# Patient Record
Sex: FEMALE | Race: BLACK OR AFRICAN AMERICAN | NOT HISPANIC OR LATINO | URBAN - METROPOLITAN AREA
[De-identification: names, ages, dates, MRNs, and addresses within clinical notes are randomized per-mention and may not be internally consistent; named-entity substitution may affect disease eponyms.]

---

## 2021-01-21 ENCOUNTER — OFFICE VISIT (OUTPATIENT)
Dept: URBAN - METROPOLITAN AREA CLINIC 19 | Facility: CLINIC | Age: 36
End: 2021-01-21
Payer: COMMERCIAL

## 2021-01-21 DIAGNOSIS — K50.111 CROHN'S DISEASE OF LARGE INTESTINE WITH RECTAL BLEEDING: ICD-10-CM

## 2021-01-21 PROCEDURE — G8427 DOCREV CUR MEDS BY ELIG CLIN: HCPCS | Performed by: INTERNAL MEDICINE

## 2021-01-21 PROCEDURE — 99215 OFFICE O/P EST HI 40 MIN: CPT | Performed by: INTERNAL MEDICINE

## 2021-01-21 PROCEDURE — 99214 OFFICE O/P EST MOD 30 MIN: CPT | Performed by: INTERNAL MEDICINE

## 2021-01-21 PROCEDURE — G8484 FLU IMMUNIZE NO ADMIN: HCPCS | Performed by: INTERNAL MEDICINE

## 2021-01-21 PROCEDURE — G9903 PT SCRN TBCO ID AS NON USER: HCPCS | Performed by: INTERNAL MEDICINE

## 2021-01-21 PROCEDURE — 1036F TOBACCO NON-USER: CPT | Performed by: INTERNAL MEDICINE

## 2021-01-21 RX ORDER — EVE PRIMROSE/LINOLEIC/G-LENIC 1000 MG
CAPSULE ORAL
Qty: 0 | Refills: 0 | Status: ACTIVE | COMMUNITY
Start: 1900-01-01

## 2021-01-21 NOTE — HPI-OTHER HISTORIES
Mrs. Vanessa is a 35 year old female who was last seen in GI clinic on 3/15/2019 for Crohn's disease.    Mrs. Vanessa reports since her last clinic visit she had another child and is currently breast feeding.   She reports having return of flare symptoms around 10/2020. She reports seeing blood in stool. She reports having associated diarrhea and abdominal pain.   She reports having 4-7 BMs/day.   Prior history is summarized below: -On 4/12/2011 she had a colonoscopy that showed "inflammation and friability of the distal sigmoid and rectum" Pathology showed "moderately active procitis" with no evidence of granulomas, viral inclusions, or dysplasia. After the procedure she was prescribed anusol and sulfasalazine. -On 10/16/2018 she had a colonoscopy that showed discontinous areas of ulcerated mucosa in the rectum, sigmoid colon, ascending colon, and cecum; the transverse colon, descending colon and terminal ileum were normal. Pathology of the cecum, ascending colon, transverse colon, sigmoid colon, and rectum showed chronic active colitis; pathology of the descending colon was unremarkable. -Labs from 10/23/2018 showed quantiferon TB was negative, vitamin D 20.1, Hep A ab total negative, Hep B s Ab negative, Hep B c Ab total negative, Hep B s AB consistent with immunity, Hep C Ab negative- she reports completing Vitamin D Rx provided -On 10/30/2018 she had a MRE that was negative other than "very large amount of stool noted throughout the colon from the cecum through the distal sigmoid colon without stricture noted." -She started cimiza on 2/13/2019 and her last dose was on 3/13/2019.

## 2021-01-27 ENCOUNTER — TELEPHONE ENCOUNTER (OUTPATIENT)
Dept: URBAN - METROPOLITAN AREA CLINIC 92 | Facility: CLINIC | Age: 36
End: 2021-01-27

## 2021-01-27 LAB
A/G RATIO: 1.6
ALBUMIN: 4.5
ALKALINE PHOSPHATASE: 75
ALT (SGPT): 13
AST (SGOT): 18
BILIRUBIN, TOTAL: 0.3
BUN/CREATININE RATIO: 10
BUN: 9
C-REACTIVE PROTEIN, QUANT: 3
CALCIUM: 9.3
CARBON DIOXIDE, TOTAL: 26
CHLORIDE: 101
CREATININE: 0.86
EGFR IF AFRICN AM: 101
EGFR IF NONAFRICN AM: 88
GASTROINTESTINAL PATHOGEN: (no result)
GLOBULIN, TOTAL: 2.9
GLUCOSE: 81
HBSAG SCREEN: NEGATIVE
HEMATOCRIT: 39.7
HEMOGLOBIN: 13
HEP B CORE AB, TOT: NEGATIVE
HEP B SURFACE AB, QUAL: REACTIVE
HEP C VIRUS AB: <0.1
MCH: 27.5
MCHC: 32.7
MCV: 84
NRBC: (no result)
PLATELETS: 382
POTASSIUM: 4.5
PROTEIN, TOTAL: 7.4
QUANTIFERON CRITERIA: (no result)
QUANTIFERON INCUBATION: (no result)
QUANTIFERON MITOGEN VALUE: >10
QUANTIFERON NIL VALUE: 0.02
QUANTIFERON TB1 AG VALUE: 0.05
QUANTIFERON TB2 AG VALUE: 0.04
QUANTIFERON-TB GOLD PLUS: NEGATIVE
RBC: 4.73
RDW: 13.1
SODIUM: 138
WBC: 8

## 2021-01-27 RX ORDER — EVE PRIMROSE/LINOLEIC/G-LENIC 1000 MG
CAPSULE ORAL
Qty: 0 | Refills: 0 | Status: ACTIVE | COMMUNITY
Start: 1900-01-01

## 2021-01-27 RX ORDER — CERTOLIZUMAB PEGOL 200 MG/ML
1 ML INJECTION, SOLUTION SUBCUTANEOUS
Qty: 2 | OUTPATIENT
Start: 2021-01-27 | End: 2021-02-26

## 2021-01-27 RX ORDER — CERTOLIZUMAB PEGOL 200 MG/ML
1 ML INJECTION, SOLUTION SUBCUTANEOUS
Qty: 6 | Refills: 3 | OUTPATIENT
Start: 2021-01-27 | End: 2022-01-22

## 2021-02-12 ENCOUNTER — TELEPHONE ENCOUNTER (OUTPATIENT)
Dept: URBAN - METROPOLITAN AREA CLINIC 92 | Facility: CLINIC | Age: 36
End: 2021-02-12

## 2021-02-12 RX ORDER — EVE PRIMROSE/LINOLEIC/G-LENIC 1000 MG
CAPSULE ORAL
Qty: 0 | Refills: 0 | Status: ACTIVE | COMMUNITY
Start: 1900-01-01

## 2021-02-12 RX ORDER — CERTOLIZUMAB PEGOL 200 MG/ML
1 ML INJECTION, SOLUTION SUBCUTANEOUS
Qty: 2 | Status: ACTIVE | COMMUNITY
Start: 2021-01-27 | End: 2021-02-26

## 2021-02-12 RX ORDER — CERTOLIZUMAB PEGOL 200 MG/ML
1 ML INJECTION, SOLUTION SUBCUTANEOUS
Qty: 2 | OUTPATIENT
Start: 2021-02-12

## 2021-02-12 RX ORDER — CERTOLIZUMAB PEGOL 200 MG/ML
1 ML INJECTION, SOLUTION SUBCUTANEOUS
Qty: 6 | Refills: 3 | Status: ACTIVE | COMMUNITY
Start: 2021-01-27 | End: 2022-01-22

## 2021-02-12 RX ORDER — CERTOLIZUMAB PEGOL 200 MG/ML
1 ML INJECTION, SOLUTION SUBCUTANEOUS
Qty: 6 | Refills: 3 | OUTPATIENT
Start: 2021-02-12 | End: 2022-02-07

## 2021-03-18 ENCOUNTER — OFFICE VISIT (OUTPATIENT)
Dept: URBAN - METROPOLITAN AREA CLINIC 19 | Facility: CLINIC | Age: 36
End: 2021-03-18

## 2021-04-19 ENCOUNTER — TELEPHONE ENCOUNTER (OUTPATIENT)
Dept: URBAN - METROPOLITAN AREA CLINIC 92 | Facility: CLINIC | Age: 36
End: 2021-04-19

## 2021-04-23 ENCOUNTER — TELEPHONE ENCOUNTER (OUTPATIENT)
Dept: URBAN - METROPOLITAN AREA CLINIC 96 | Facility: CLINIC | Age: 36
End: 2021-04-23

## 2021-04-23 ENCOUNTER — TELEPHONE ENCOUNTER (OUTPATIENT)
Dept: URBAN - METROPOLITAN AREA CLINIC 19 | Facility: CLINIC | Age: 36
End: 2021-04-23

## 2021-05-13 ENCOUNTER — TELEPHONE ENCOUNTER (OUTPATIENT)
Dept: URBAN - METROPOLITAN AREA CLINIC 19 | Facility: CLINIC | Age: 36
End: 2021-05-13

## 2021-05-20 ENCOUNTER — OFFICE VISIT (OUTPATIENT)
Dept: URBAN - METROPOLITAN AREA CLINIC 79 | Facility: CLINIC | Age: 36
End: 2021-05-20
Payer: COMMERCIAL

## 2021-05-20 VITALS
TEMPERATURE: 97.5 F | HEART RATE: 102 BPM | RESPIRATION RATE: 20 BRPM | HEIGHT: 62 IN | SYSTOLIC BLOOD PRESSURE: 117 MMHG | DIASTOLIC BLOOD PRESSURE: 77 MMHG | BODY MASS INDEX: 33.86 KG/M2 | WEIGHT: 184 LBS

## 2021-05-20 DIAGNOSIS — K50.80 CROHN'S COLITIS: ICD-10-CM

## 2021-05-20 PROCEDURE — 96372 THER/PROPH/DIAG INJ SC/IM: CPT | Performed by: INTERNAL MEDICINE

## 2021-05-20 RX ORDER — CERTOLIZUMAB PEGOL 200 MG/ML
1 ML INJECTION, SOLUTION SUBCUTANEOUS
Qty: 2 | Status: ACTIVE | COMMUNITY
Start: 2021-02-12

## 2021-05-20 RX ORDER — CERTOLIZUMAB PEGOL 200 MG/ML
1 ML INJECTION, SOLUTION SUBCUTANEOUS
Qty: 6 | Refills: 3 | Status: ACTIVE | COMMUNITY
Start: 2021-02-12 | End: 2022-02-07

## 2021-05-20 RX ORDER — CERTOLIZUMAB PEGOL 200 MG/ML
1 ML INJECTION, SOLUTION SUBCUTANEOUS
Qty: 6 | Refills: 3 | Status: ACTIVE | COMMUNITY
Start: 2021-01-27 | End: 2022-01-22

## 2021-05-20 RX ORDER — EVE PRIMROSE/LINOLEIC/G-LENIC 1000 MG
CAPSULE ORAL
Qty: 0 | Refills: 0 | Status: ACTIVE | COMMUNITY
Start: 1900-01-01

## 2021-06-03 ENCOUNTER — OFFICE VISIT (OUTPATIENT)
Dept: URBAN - METROPOLITAN AREA CLINIC 79 | Facility: CLINIC | Age: 36
End: 2021-06-03
Payer: COMMERCIAL

## 2021-06-03 VITALS
BODY MASS INDEX: 33.86 KG/M2 | SYSTOLIC BLOOD PRESSURE: 121 MMHG | TEMPERATURE: 97.5 F | RESPIRATION RATE: 20 BRPM | HEIGHT: 62 IN | DIASTOLIC BLOOD PRESSURE: 84 MMHG | HEART RATE: 95 BPM | WEIGHT: 184 LBS

## 2021-06-03 DIAGNOSIS — K50.80 CROHN'S COLITIS: ICD-10-CM

## 2021-06-03 PROCEDURE — 96372 THER/PROPH/DIAG INJ SC/IM: CPT | Performed by: INTERNAL MEDICINE

## 2021-06-03 RX ORDER — CERTOLIZUMAB PEGOL 200 MG/ML
1 ML INJECTION, SOLUTION SUBCUTANEOUS
Qty: 6 | Refills: 3 | Status: ACTIVE | COMMUNITY
Start: 2021-01-27 | End: 2022-01-22

## 2021-06-03 RX ORDER — CERTOLIZUMAB PEGOL 200 MG/ML
1 ML INJECTION, SOLUTION SUBCUTANEOUS
Qty: 6 | Refills: 3 | Status: ACTIVE | COMMUNITY
Start: 2021-02-12 | End: 2022-02-07

## 2021-06-03 RX ORDER — CERTOLIZUMAB PEGOL 200 MG/ML
1 ML INJECTION, SOLUTION SUBCUTANEOUS
Qty: 2 | Status: ACTIVE | COMMUNITY
Start: 2021-02-12

## 2021-06-03 RX ORDER — EVE PRIMROSE/LINOLEIC/G-LENIC 1000 MG
CAPSULE ORAL
Qty: 0 | Refills: 0 | Status: ACTIVE | COMMUNITY
Start: 1900-01-01

## 2021-06-15 ENCOUNTER — OFFICE VISIT (OUTPATIENT)
Dept: URBAN - METROPOLITAN AREA CLINIC 79 | Facility: CLINIC | Age: 36
End: 2021-06-15
Payer: COMMERCIAL

## 2021-06-15 VITALS
HEART RATE: 99 BPM | RESPIRATION RATE: 20 BRPM | HEIGHT: 62 IN | WEIGHT: 186 LBS | TEMPERATURE: 97.9 F | BODY MASS INDEX: 34.23 KG/M2 | DIASTOLIC BLOOD PRESSURE: 79 MMHG | SYSTOLIC BLOOD PRESSURE: 109 MMHG

## 2021-06-15 DIAGNOSIS — K50.80 CROHN'S COLITIS: ICD-10-CM

## 2021-06-15 PROCEDURE — 96372 THER/PROPH/DIAG INJ SC/IM: CPT | Performed by: INTERNAL MEDICINE

## 2021-06-15 RX ORDER — EVE PRIMROSE/LINOLEIC/G-LENIC 1000 MG
CAPSULE ORAL
Qty: 0 | Refills: 0 | Status: ACTIVE | COMMUNITY
Start: 1900-01-01

## 2021-06-15 RX ORDER — CERTOLIZUMAB PEGOL 200 MG/ML
1 ML INJECTION, SOLUTION SUBCUTANEOUS
Qty: 2 | Status: ACTIVE | COMMUNITY
Start: 2021-02-12

## 2021-06-15 RX ORDER — CERTOLIZUMAB PEGOL 200 MG/ML
1 ML INJECTION, SOLUTION SUBCUTANEOUS
Qty: 6 | Refills: 3 | Status: ACTIVE | COMMUNITY
Start: 2021-01-27 | End: 2022-01-22

## 2021-06-15 RX ORDER — CERTOLIZUMAB PEGOL 200 MG/ML
1 ML INJECTION, SOLUTION SUBCUTANEOUS
Qty: 6 | Refills: 3 | Status: ACTIVE | COMMUNITY
Start: 2021-02-12 | End: 2022-02-07

## 2021-06-17 ENCOUNTER — OFFICE VISIT (OUTPATIENT)
Dept: URBAN - METROPOLITAN AREA CLINIC 79 | Facility: CLINIC | Age: 36
End: 2021-06-17

## 2021-06-22 ENCOUNTER — TELEPHONE ENCOUNTER (OUTPATIENT)
Dept: URBAN - METROPOLITAN AREA CLINIC 92 | Facility: CLINIC | Age: 36
End: 2021-06-22

## 2021-06-22 RX ORDER — CERTOLIZUMAB PEGOL 200 MG/ML
1 ML INJECTION, SOLUTION SUBCUTANEOUS
Qty: 6 | Refills: 3
Start: 2021-01-27 | End: 2022-06-17

## 2021-06-28 ENCOUNTER — WEB ENCOUNTER (OUTPATIENT)
Dept: URBAN - METROPOLITAN AREA CLINIC 19 | Facility: CLINIC | Age: 36
End: 2021-06-28

## 2021-06-28 ENCOUNTER — OFFICE VISIT (OUTPATIENT)
Dept: URBAN - METROPOLITAN AREA CLINIC 19 | Facility: CLINIC | Age: 36
End: 2021-06-28
Payer: COMMERCIAL

## 2021-06-28 DIAGNOSIS — E55.9 VITAMIN D DEFICIENCY: ICD-10-CM

## 2021-06-28 DIAGNOSIS — K50.10 CROHN'S DISEASE OF LARGE INTESTINE WITHOUT COMPLICATION: ICD-10-CM

## 2021-06-28 PROCEDURE — 99214 OFFICE O/P EST MOD 30 MIN: CPT | Performed by: INTERNAL MEDICINE

## 2021-06-28 RX ORDER — CERTOLIZUMAB PEGOL 200 MG/ML
1 ML INJECTION, SOLUTION SUBCUTANEOUS
Qty: 6 | Refills: 3 | Status: ACTIVE | COMMUNITY
Start: 2021-01-27 | End: 2022-06-17

## 2021-06-28 RX ORDER — EVE PRIMROSE/LINOLEIC/G-LENIC 1000 MG
CAPSULE ORAL
Qty: 0 | Refills: 0 | Status: ACTIVE | COMMUNITY
Start: 1900-01-01

## 2021-06-28 RX ORDER — CERTOLIZUMAB PEGOL 200 MG/ML
1 ML INJECTION, SOLUTION SUBCUTANEOUS
Qty: 6 | Refills: 3 | Status: ACTIVE | COMMUNITY
Start: 2021-02-12 | End: 2022-02-07

## 2021-06-28 RX ORDER — CERTOLIZUMAB PEGOL 200 MG/ML
1 ML INJECTION, SOLUTION SUBCUTANEOUS
Qty: 2 | Status: ACTIVE | COMMUNITY
Start: 2021-02-12

## 2021-06-28 NOTE — HPI-OTHER HISTORIES
Mrs. Vanessa is a 35 year old female who was last seen in GI clinic on 1/21/2021 for Crohn's disease.        On 1/21/2021 Hgb 13.0, Cr 0.86, Alb 4.5, AST 18, ALT 13, Alk phos 75, T bili 0.3, CRP 3 (0-10), Quantiferon-TB negative, Hepatitis B s Ab positive, Hep B s Ag negative, Hep B c Ab total negative, Hep C Ab negative, GPP stool test was negative.   Mrs. Vanessa reports her Crohn's flare symptoms have resolved. She reports having 1-2 BMs/day which is an improvement from 4-7 BMs/day; she reports no blood in stool.   She reports finishing her prednisone taper 1 week ago. She reports finishing her cimzia loading doses and reports she will be starting maintance dosing 7/13/2021.   Prior history is summarized below: -On 4/12/2011 she had a colonoscopy that showed "inflammation and friability of the distal sigmoid and rectum" Pathology showed "moderately active procitis" with no evidence of granulomas, viral inclusions, or dysplasia. After the procedure she was prescribed anusol and sulfasalazine. -On 10/16/2018 she had a colonoscopy that showed discontinous areas of ulcerated mucosa in the rectum, sigmoid colon, ascending colon, and cecum; the transverse colon, descending colon and terminal ileum were normal. Pathology of the cecum, ascending colon, transverse colon, sigmoid colon, and rectum showed chronic active colitis; pathology of the descending colon was unremarkable. -Labs from 10/23/2018 showed quantiferon TB was negative, vitamin D 20.1, Hep A ab total negative, Hep B s Ab negative, Hep B c Ab total negative, Hep B s AB consistent with immunity, Hep C Ab negative- she reports completing Vitamin D Rx provided -On 10/30/2018 she had a MRE that was negative other than "very large amount of stool noted throughout the colon from the cecum through the distal sigmoid colon without stricture noted." -She started cimiza on 2/13/2019 and her last dose was on 3/13/2019. -She reports having return of flare symptoms around 10/2020. At last clinic visit we prescribed prednisone taper and we reports she wanted she wanted to try cimzia again.

## 2021-06-29 ENCOUNTER — TELEPHONE ENCOUNTER (OUTPATIENT)
Dept: URBAN - METROPOLITAN AREA CLINIC 92 | Facility: CLINIC | Age: 36
End: 2021-06-29

## 2021-06-29 LAB — VITAMIN D, 25-HYDROXY: 27.4

## 2021-07-13 ENCOUNTER — OFFICE VISIT (OUTPATIENT)
Dept: URBAN - METROPOLITAN AREA CLINIC 79 | Facility: CLINIC | Age: 36
End: 2021-07-13

## 2021-07-26 ENCOUNTER — WEB ENCOUNTER (OUTPATIENT)
Dept: URBAN - METROPOLITAN AREA CLINIC 20 | Facility: CLINIC | Age: 36
End: 2021-07-26

## 2021-08-21 ENCOUNTER — WEB ENCOUNTER (OUTPATIENT)
Dept: URBAN - METROPOLITAN AREA CLINIC 20 | Facility: CLINIC | Age: 36
End: 2021-08-21

## 2021-09-18 LAB
ANTI-CERTOLIZUMAB AB LEVEL: (no result)
CERTOLIZUMAB DRUG LEVEL: <1

## 2021-09-20 ENCOUNTER — TELEPHONE ENCOUNTER (OUTPATIENT)
Dept: URBAN - METROPOLITAN AREA CLINIC 92 | Facility: CLINIC | Age: 36
End: 2021-09-20

## 2021-09-20 ENCOUNTER — WEB ENCOUNTER (OUTPATIENT)
Dept: URBAN - METROPOLITAN AREA CLINIC 20 | Facility: CLINIC | Age: 36
End: 2021-09-20

## 2021-09-20 RX ORDER — EVE PRIMROSE/LINOLEIC/G-LENIC 1000 MG
CAPSULE ORAL
Qty: 0 | Refills: 0 | Status: ACTIVE | COMMUNITY
Start: 1900-01-01

## 2021-09-20 RX ORDER — CERTOLIZUMAB PEGOL 200 MG/ML
1 ML INJECTION, SOLUTION SUBCUTANEOUS
Qty: 2 | Status: ACTIVE | COMMUNITY
Start: 2021-02-12

## 2021-09-20 RX ORDER — VEDOLIZUMAB 300 MG/5ML
AS DIRECTED INJECTION, POWDER, LYOPHILIZED, FOR SOLUTION INTRAVENOUS
Qty: 300 MILLIGRAMS | Refills: 0 | OUTPATIENT
Start: 2021-09-20 | End: 2021-12-18

## 2021-09-20 RX ORDER — CERTOLIZUMAB PEGOL 200 MG/ML
1 ML INJECTION, SOLUTION SUBCUTANEOUS
Qty: 6 | Refills: 3 | Status: ACTIVE | COMMUNITY
Start: 2021-01-27 | End: 2022-06-17

## 2021-09-20 RX ORDER — CERTOLIZUMAB PEGOL 200 MG/ML
1 ML INJECTION, SOLUTION SUBCUTANEOUS
Qty: 6 | Refills: 3 | Status: ACTIVE | COMMUNITY
Start: 2021-02-12 | End: 2022-02-07

## 2021-10-02 ENCOUNTER — WEB ENCOUNTER (OUTPATIENT)
Dept: URBAN - METROPOLITAN AREA CLINIC 19 | Facility: CLINIC | Age: 36
End: 2021-10-02

## 2021-10-11 ENCOUNTER — WEB ENCOUNTER (OUTPATIENT)
Dept: URBAN - METROPOLITAN AREA CLINIC 20 | Facility: CLINIC | Age: 36
End: 2021-10-11

## 2021-10-21 ENCOUNTER — OFFICE VISIT (OUTPATIENT)
Dept: URBAN - METROPOLITAN AREA CLINIC 79 | Facility: CLINIC | Age: 36
End: 2021-10-21
Payer: COMMERCIAL

## 2021-10-21 VITALS
DIASTOLIC BLOOD PRESSURE: 75 MMHG | TEMPERATURE: 97.5 F | WEIGHT: 188 LBS | HEIGHT: 62 IN | RESPIRATION RATE: 18 BRPM | HEART RATE: 93 BPM | BODY MASS INDEX: 34.6 KG/M2 | SYSTOLIC BLOOD PRESSURE: 105 MMHG

## 2021-10-21 DIAGNOSIS — K50.80 CROHN'S COLITIS: ICD-10-CM

## 2021-10-21 PROCEDURE — 96413 CHEMO IV INFUSION 1 HR: CPT | Performed by: INTERNAL MEDICINE

## 2021-10-21 RX ORDER — CERTOLIZUMAB PEGOL 200 MG/ML
1 ML INJECTION, SOLUTION SUBCUTANEOUS
Qty: 2 | Status: ACTIVE | COMMUNITY
Start: 2021-02-12

## 2021-10-21 RX ORDER — VEDOLIZUMAB 300 MG/5ML
AS DIRECTED INJECTION, POWDER, LYOPHILIZED, FOR SOLUTION INTRAVENOUS
Qty: 300 MILLIGRAMS | Refills: 0 | Status: ACTIVE | COMMUNITY
Start: 2021-09-20 | End: 2021-12-18

## 2021-10-21 RX ORDER — CERTOLIZUMAB PEGOL 200 MG/ML
1 ML INJECTION, SOLUTION SUBCUTANEOUS
Qty: 6 | Refills: 3 | Status: ACTIVE | COMMUNITY
Start: 2021-02-12 | End: 2022-02-07

## 2021-10-21 RX ORDER — EVE PRIMROSE/LINOLEIC/G-LENIC 1000 MG
CAPSULE ORAL
Qty: 0 | Refills: 0 | Status: ACTIVE | COMMUNITY
Start: 1900-01-01

## 2021-10-21 RX ORDER — CERTOLIZUMAB PEGOL 200 MG/ML
1 ML INJECTION, SOLUTION SUBCUTANEOUS
Qty: 6 | Refills: 3 | Status: ACTIVE | COMMUNITY
Start: 2021-01-27 | End: 2022-06-17

## 2021-11-03 ENCOUNTER — TELEPHONE ENCOUNTER (OUTPATIENT)
Dept: URBAN - METROPOLITAN AREA CLINIC 80 | Facility: CLINIC | Age: 36
End: 2021-11-03

## 2021-11-09 ENCOUNTER — OFFICE VISIT (OUTPATIENT)
Dept: URBAN - METROPOLITAN AREA CLINIC 79 | Facility: CLINIC | Age: 36
End: 2021-11-09
Payer: COMMERCIAL

## 2021-11-09 VITALS
DIASTOLIC BLOOD PRESSURE: 81 MMHG | BODY MASS INDEX: 34.23 KG/M2 | HEART RATE: 93 BPM | TEMPERATURE: 97.7 F | RESPIRATION RATE: 18 BRPM | HEIGHT: 62 IN | WEIGHT: 186 LBS | SYSTOLIC BLOOD PRESSURE: 111 MMHG

## 2021-11-09 DIAGNOSIS — K50.80 CROHN'S COLITIS: ICD-10-CM

## 2021-11-09 PROCEDURE — 96413 CHEMO IV INFUSION 1 HR: CPT | Performed by: INTERNAL MEDICINE

## 2021-11-09 RX ORDER — CERTOLIZUMAB PEGOL 200 MG/ML
1 ML INJECTION, SOLUTION SUBCUTANEOUS
Qty: 6 | Refills: 3 | Status: ACTIVE | COMMUNITY
Start: 2021-01-27 | End: 2022-06-17

## 2021-11-09 RX ORDER — VEDOLIZUMAB 300 MG/5ML
AS DIRECTED INJECTION, POWDER, LYOPHILIZED, FOR SOLUTION INTRAVENOUS
Qty: 300 MILLIGRAMS | Refills: 0 | Status: ACTIVE | COMMUNITY
Start: 2021-09-20 | End: 2021-12-18

## 2021-11-09 RX ORDER — CERTOLIZUMAB PEGOL 200 MG/ML
1 ML INJECTION, SOLUTION SUBCUTANEOUS
Qty: 6 | Refills: 3 | Status: ACTIVE | COMMUNITY
Start: 2021-02-12 | End: 2022-02-07

## 2021-11-09 RX ORDER — EVE PRIMROSE/LINOLEIC/G-LENIC 1000 MG
CAPSULE ORAL
Qty: 0 | Refills: 0 | Status: ACTIVE | COMMUNITY
Start: 1900-01-01

## 2021-11-09 RX ORDER — CERTOLIZUMAB PEGOL 200 MG/ML
1 ML INJECTION, SOLUTION SUBCUTANEOUS
Qty: 2 | Status: ACTIVE | COMMUNITY
Start: 2021-02-12

## 2021-11-12 ENCOUNTER — OFFICE VISIT (OUTPATIENT)
Dept: URBAN - METROPOLITAN AREA SURGERY CENTER 31 | Facility: SURGERY CENTER | Age: 36
End: 2021-11-12

## 2021-11-19 ENCOUNTER — OFFICE VISIT (OUTPATIENT)
Dept: URBAN - METROPOLITAN AREA SURGERY CENTER 31 | Facility: SURGERY CENTER | Age: 36
End: 2021-11-19

## 2021-12-02 ENCOUNTER — OFFICE VISIT (OUTPATIENT)
Dept: URBAN - METROPOLITAN AREA CLINIC 79 | Facility: CLINIC | Age: 36
End: 2021-12-02
Payer: COMMERCIAL

## 2021-12-02 ENCOUNTER — OFFICE VISIT (OUTPATIENT)
Dept: URBAN - METROPOLITAN AREA CLINIC 19 | Facility: CLINIC | Age: 36
End: 2021-12-02

## 2021-12-02 VITALS
SYSTOLIC BLOOD PRESSURE: 108 MMHG | BODY MASS INDEX: 34.6 KG/M2 | HEART RATE: 78 BPM | WEIGHT: 188 LBS | TEMPERATURE: 97.5 F | HEIGHT: 62 IN | RESPIRATION RATE: 16 BRPM | DIASTOLIC BLOOD PRESSURE: 77 MMHG

## 2021-12-02 DIAGNOSIS — K50.80 CROHN'S COLITIS: ICD-10-CM

## 2021-12-02 PROCEDURE — 96413 CHEMO IV INFUSION 1 HR: CPT | Performed by: INTERNAL MEDICINE

## 2021-12-02 RX ORDER — EVE PRIMROSE/LINOLEIC/G-LENIC 1000 MG
CAPSULE ORAL
Qty: 0 | Refills: 0 | Status: ACTIVE | COMMUNITY
Start: 1900-01-01

## 2021-12-02 RX ORDER — VEDOLIZUMAB 300 MG/5ML
AS DIRECTED INJECTION, POWDER, LYOPHILIZED, FOR SOLUTION INTRAVENOUS
Qty: 300 MILLIGRAMS | Refills: 0 | Status: ACTIVE | COMMUNITY
Start: 2021-09-20 | End: 2021-12-18

## 2021-12-02 RX ORDER — CERTOLIZUMAB PEGOL 200 MG/ML
1 ML INJECTION, SOLUTION SUBCUTANEOUS
Qty: 6 | Refills: 3 | Status: ACTIVE | COMMUNITY
Start: 2021-02-12 | End: 2022-02-07

## 2021-12-02 RX ORDER — CERTOLIZUMAB PEGOL 200 MG/ML
1 ML INJECTION, SOLUTION SUBCUTANEOUS
Qty: 2 | Status: ACTIVE | COMMUNITY
Start: 2021-02-12

## 2021-12-02 RX ORDER — CERTOLIZUMAB PEGOL 200 MG/ML
1 ML INJECTION, SOLUTION SUBCUTANEOUS
Qty: 6 | Refills: 3 | Status: ACTIVE | COMMUNITY
Start: 2021-01-27 | End: 2022-06-17

## 2021-12-06 PROBLEM — 71833008 CROHN'S DISEASE OF SMALL AND LARGE INTESTINES: Status: ACTIVE | Noted: 2021-12-06

## 2021-12-20 ENCOUNTER — TELEPHONE ENCOUNTER (OUTPATIENT)
Dept: URBAN - METROPOLITAN AREA CLINIC 19 | Facility: CLINIC | Age: 36
End: 2021-12-20

## 2021-12-20 RX ORDER — CERTOLIZUMAB PEGOL 200 MG/ML
1 ML INJECTION, SOLUTION SUBCUTANEOUS
Qty: 6 | Refills: 3 | Status: DISCONTINUED | COMMUNITY
Start: 2021-01-27 | End: 2022-06-17

## 2021-12-20 RX ORDER — VEDOLIZUMAB 300 MG/5ML
AS DIRECTED INJECTION, POWDER, LYOPHILIZED, FOR SOLUTION INTRAVENOUS
Qty: 300 MILLIGRAMS | Refills: 0 | OUTPATIENT
Start: 2021-12-20 | End: 2022-03-20

## 2021-12-20 RX ORDER — CERTOLIZUMAB PEGOL 200 MG/ML
1 ML INJECTION, SOLUTION SUBCUTANEOUS
Qty: 6 | Refills: 3 | Status: DISCONTINUED | COMMUNITY
Start: 2021-02-12 | End: 2022-02-07

## 2021-12-20 RX ORDER — CERTOLIZUMAB PEGOL 200 MG/ML
1 ML INJECTION, SOLUTION SUBCUTANEOUS
Qty: 2 | Status: ON HOLD | COMMUNITY
Start: 2021-02-12

## 2021-12-20 RX ORDER — VEDOLIZUMAB 300 MG/5ML
AS DIRECTED INJECTION, POWDER, LYOPHILIZED, FOR SOLUTION INTRAVENOUS
Status: ACTIVE | COMMUNITY

## 2021-12-20 RX ORDER — VEDOLIZUMAB 300 MG/5ML
AS DIRECTED INJECTION, POWDER, LYOPHILIZED, FOR SOLUTION INTRAVENOUS
Qty: 300 MILLIGRAMS | Refills: 0 | Status: ON HOLD | COMMUNITY
Start: 2021-09-20 | End: 2021-12-18

## 2021-12-20 RX ORDER — CERTOLIZUMAB PEGOL 200 MG/ML
1 ML INJECTION, SOLUTION SUBCUTANEOUS
Qty: 2 | Status: DISCONTINUED | COMMUNITY
Start: 2021-02-12

## 2021-12-20 RX ORDER — CERTOLIZUMAB PEGOL 200 MG/ML
1 ML INJECTION, SOLUTION SUBCUTANEOUS
Qty: 6 | Refills: 3 | Status: ON HOLD | COMMUNITY
Start: 2021-02-12 | End: 2022-02-07

## 2021-12-20 RX ORDER — EVE PRIMROSE/LINOLEIC/G-LENIC 1000 MG
CAPSULE ORAL
Qty: 0 | Refills: 0 | Status: ACTIVE | COMMUNITY
Start: 1900-01-01

## 2021-12-20 RX ORDER — CERTOLIZUMAB PEGOL 200 MG/ML
1 ML INJECTION, SOLUTION SUBCUTANEOUS
Qty: 6 | Refills: 3 | Status: ON HOLD | COMMUNITY
Start: 2021-01-27 | End: 2022-06-17

## 2022-01-25 ENCOUNTER — OFFICE VISIT (OUTPATIENT)
Dept: URBAN - METROPOLITAN AREA CLINIC 79 | Facility: CLINIC | Age: 37
End: 2022-01-25
Payer: COMMERCIAL

## 2022-01-25 VITALS
BODY MASS INDEX: 34.23 KG/M2 | HEART RATE: 81 BPM | SYSTOLIC BLOOD PRESSURE: 103 MMHG | HEIGHT: 62 IN | RESPIRATION RATE: 18 BRPM | TEMPERATURE: 98.1 F | DIASTOLIC BLOOD PRESSURE: 77 MMHG | WEIGHT: 186 LBS

## 2022-01-25 DIAGNOSIS — K50.80 CROHN'S COLITIS: ICD-10-CM

## 2022-01-25 PROCEDURE — 96413 CHEMO IV INFUSION 1 HR: CPT | Performed by: INTERNAL MEDICINE

## 2022-01-25 RX ORDER — CERTOLIZUMAB PEGOL 200 MG/ML
1 ML INJECTION, SOLUTION SUBCUTANEOUS
Qty: 6 | Refills: 3 | Status: ON HOLD | COMMUNITY
Start: 2021-02-12 | End: 2022-02-07

## 2022-01-25 RX ORDER — CERTOLIZUMAB PEGOL 200 MG/ML
1 ML INJECTION, SOLUTION SUBCUTANEOUS
Qty: 6 | Refills: 3 | Status: ON HOLD | COMMUNITY
Start: 2021-01-27 | End: 2022-06-17

## 2022-01-25 RX ORDER — CERTOLIZUMAB PEGOL 200 MG/ML
1 ML INJECTION, SOLUTION SUBCUTANEOUS
Qty: 2 | Status: ON HOLD | COMMUNITY
Start: 2021-02-12

## 2022-01-25 RX ORDER — VEDOLIZUMAB 300 MG/5ML
AS DIRECTED INJECTION, POWDER, LYOPHILIZED, FOR SOLUTION INTRAVENOUS
Status: ACTIVE | COMMUNITY

## 2022-01-25 RX ORDER — VEDOLIZUMAB 300 MG/5ML
AS DIRECTED INJECTION, POWDER, LYOPHILIZED, FOR SOLUTION INTRAVENOUS
Qty: 300 MILLIGRAMS | Refills: 0 | Status: ACTIVE | COMMUNITY
Start: 2021-12-20 | End: 2022-03-20

## 2022-01-25 RX ORDER — EVE PRIMROSE/LINOLEIC/G-LENIC 1000 MG
CAPSULE ORAL
Qty: 0 | Refills: 0 | Status: ACTIVE | COMMUNITY
Start: 1900-01-01

## 2022-03-14 ENCOUNTER — WEB ENCOUNTER (OUTPATIENT)
Dept: URBAN - METROPOLITAN AREA CLINIC 19 | Facility: CLINIC | Age: 37
End: 2022-03-14

## 2022-03-22 ENCOUNTER — OFFICE VISIT (OUTPATIENT)
Dept: URBAN - METROPOLITAN AREA CLINIC 79 | Facility: CLINIC | Age: 37
End: 2022-03-22
Payer: COMMERCIAL

## 2022-03-22 VITALS
HEART RATE: 102 BPM | WEIGHT: 186 LBS | TEMPERATURE: 97.7 F | BODY MASS INDEX: 34.23 KG/M2 | SYSTOLIC BLOOD PRESSURE: 103 MMHG | HEIGHT: 62 IN | RESPIRATION RATE: 18 BRPM | DIASTOLIC BLOOD PRESSURE: 77 MMHG

## 2022-03-22 DIAGNOSIS — K50.80 CROHN'S COLITIS: ICD-10-CM

## 2022-03-22 PROCEDURE — 96413 CHEMO IV INFUSION 1 HR: CPT | Performed by: INTERNAL MEDICINE

## 2022-03-22 RX ORDER — EVE PRIMROSE/LINOLEIC/G-LENIC 1000 MG
CAPSULE ORAL
Qty: 0 | Refills: 0 | Status: ACTIVE | COMMUNITY
Start: 1900-01-01

## 2022-03-22 RX ORDER — CERTOLIZUMAB PEGOL 200 MG/ML
1 ML INJECTION, SOLUTION SUBCUTANEOUS
Qty: 6 | Refills: 3 | Status: ON HOLD | COMMUNITY
Start: 2021-01-27 | End: 2022-06-17

## 2022-03-22 RX ORDER — CERTOLIZUMAB PEGOL 200 MG/ML
1 ML INJECTION, SOLUTION SUBCUTANEOUS
Qty: 2 | Status: ON HOLD | COMMUNITY
Start: 2021-02-12

## 2022-03-22 RX ORDER — VEDOLIZUMAB 300 MG/5ML
AS DIRECTED INJECTION, POWDER, LYOPHILIZED, FOR SOLUTION INTRAVENOUS
Status: ACTIVE | COMMUNITY

## 2022-03-31 ENCOUNTER — OFFICE VISIT (OUTPATIENT)
Dept: URBAN - METROPOLITAN AREA CLINIC 19 | Facility: CLINIC | Age: 37
End: 2022-03-31
Payer: COMMERCIAL

## 2022-03-31 ENCOUNTER — LAB OUTSIDE AN ENCOUNTER (OUTPATIENT)
Dept: URBAN - METROPOLITAN AREA CLINIC 19 | Facility: CLINIC | Age: 37
End: 2022-03-31

## 2022-03-31 DIAGNOSIS — K50.10 CROHN'S DISEASE OF LARGE INTESTINE WITHOUT COMPLICATION: ICD-10-CM

## 2022-03-31 PROCEDURE — 99213 OFFICE O/P EST LOW 20 MIN: CPT | Performed by: NURSE PRACTITIONER

## 2022-03-31 RX ORDER — EVE PRIMROSE/LINOLEIC/G-LENIC 1000 MG
CAPSULE ORAL
Qty: 0 | Refills: 0 | Status: ON HOLD | COMMUNITY
Start: 1900-01-01

## 2022-03-31 RX ORDER — CERTOLIZUMAB PEGOL 200 MG/ML
1 ML INJECTION, SOLUTION SUBCUTANEOUS
Qty: 6 | Refills: 3 | Status: ON HOLD | COMMUNITY
Start: 2021-01-27 | End: 2022-06-17

## 2022-03-31 RX ORDER — CERTOLIZUMAB PEGOL 200 MG/ML
1 ML INJECTION, SOLUTION SUBCUTANEOUS
Qty: 2 | Status: ON HOLD | COMMUNITY
Start: 2021-02-12

## 2022-03-31 RX ORDER — VEDOLIZUMAB 300 MG/5ML
AS DIRECTED INJECTION, POWDER, LYOPHILIZED, FOR SOLUTION INTRAVENOUS
Status: ACTIVE | COMMUNITY

## 2022-03-31 NOTE — HPI-TODAY'S VISIT:
Ms. Vanessa is a 36-year-old female who was last seen in GI clinic by Dr. Min on 6/28/2021 for Crohn's disease.  Patient was started on Entyvio 11/2021.  Today, she reports she had left-sided abdominal pain and increase in number of bowel movements a couple of weeks ago.  She had her infusion about a week later.  She reports after receiving her infusion she felt better.  She reports her symptoms started on about week 6 after receiving her last infusion.  Patient had labs with PCP 3/4/2022.  She had a normal CBC and CMP.  She also had vitamin D checked which was 24.  She reports she was not taking it regularly at that time.  She reports she is now taking it daily and her PCP plans recheck in June.  Patient reports she is having formed bowel movements twice a day.  No blood in stool or abdominal pain.  Overall, feeling well today. She denies cardiac/kidney/lung disease, diabetes, blood thinners, and home O2.   Prior history is summarized below: -On 4/12/2011 she had a colonoscopy that showed "inflammation and friability of the distal sigmoid and rectum" Pathology showed "moderately active procitis" with no evidence of granulomas, viral inclusions, or dysplasia. After the procedure she was prescribed anusol and sulfasalazine. -On 10/16/2018 she had a colonoscopy that showed discontinous areas of ulcerated mucosa in the rectum, sigmoid colon, ascending colon, and cecum; the transverse colon, descending colon and terminal ileum were normal. Pathology of the cecum, ascending colon, transverse colon, sigmoid colon, and rectum showed chronic active colitis; pathology of the descending colon was unremarkable. -Labs from 10/23/2018 showed quantiferon TB was negative, vitamin D 20.1, Hep A ab total negative, Hep B s Ab negative, Hep B c Ab total negative, Hep B s AB consistent with immunity, Hep C Ab negative- she reports completing Vitamin D Rx provided -On 10/30/2018 she had a MRE that was negative other than "very large amount of stool noted throughout the colon from the cecum through the distal sigmoid colon without stricture noted." -She started cimiza on 2/13/2019 and her last dose was on 3/13/2019. -She reports having return of flare symptoms around 10/2020. At last clinic visit we prescribed prednisone taper and we reports she wanted she wanted to try cimzia again. -On 1/21/2021 Hgb 13.0, Cr 0.86, Alb 4.5, AST 18, ALT 13, Alk phos 75, T bili 0.3, CRP 3 (0-10), Quantiferon-TB negative, Hepatitis B s Ab positive, Hep B s Ag negative, Hep B c Ab total negative, Hep C Ab negative, GPP stool test was negative.

## 2022-04-05 ENCOUNTER — WEB ENCOUNTER (OUTPATIENT)
Dept: URBAN - METROPOLITAN AREA CLINIC 19 | Facility: CLINIC | Age: 37
End: 2022-04-05

## 2022-04-05 LAB
C-REACTIVE PROTEIN, QUANT: 4
HBSAG SCREEN: NEGATIVE
QUANTIFERON CRITERIA: (no result)
QUANTIFERON INCUBATION: (no result)
QUANTIFERON MITOGEN VALUE: >10
QUANTIFERON NIL VALUE: 0.1
QUANTIFERON TB1 AG VALUE: 0.1
QUANTIFERON TB2 AG VALUE: 0.14
QUANTIFERON-TB GOLD PLUS: NEGATIVE

## 2022-04-06 ENCOUNTER — WEB ENCOUNTER (OUTPATIENT)
Dept: URBAN - METROPOLITAN AREA CLINIC 19 | Facility: CLINIC | Age: 37
End: 2022-04-06

## 2022-04-12 PROBLEM — 7620006: Status: ACTIVE | Noted: 2021-06-28

## 2022-05-16 ENCOUNTER — OFFICE VISIT (OUTPATIENT)
Dept: URBAN - METROPOLITAN AREA CLINIC 79 | Facility: CLINIC | Age: 37
End: 2022-05-16
Payer: COMMERCIAL

## 2022-05-16 VITALS
HEART RATE: 73 BPM | DIASTOLIC BLOOD PRESSURE: 76 MMHG | HEIGHT: 62 IN | TEMPERATURE: 97.9 F | SYSTOLIC BLOOD PRESSURE: 107 MMHG | WEIGHT: 180 LBS | BODY MASS INDEX: 33.13 KG/M2 | RESPIRATION RATE: 20 BRPM

## 2022-05-16 DIAGNOSIS — K50.80 CROHN'S COLITIS: ICD-10-CM

## 2022-05-16 PROCEDURE — 96413 CHEMO IV INFUSION 1 HR: CPT | Performed by: INTERNAL MEDICINE

## 2022-05-16 RX ORDER — CERTOLIZUMAB PEGOL 200 MG/ML
1 ML INJECTION, SOLUTION SUBCUTANEOUS
Qty: 2 | Status: ON HOLD | COMMUNITY
Start: 2021-02-12

## 2022-05-16 RX ORDER — EVE PRIMROSE/LINOLEIC/G-LENIC 1000 MG
CAPSULE ORAL
Qty: 0 | Refills: 0 | Status: ON HOLD | COMMUNITY
Start: 1900-01-01

## 2022-05-16 RX ORDER — CERTOLIZUMAB PEGOL 200 MG/ML
1 ML INJECTION, SOLUTION SUBCUTANEOUS
Qty: 6 | Refills: 3 | Status: ON HOLD | COMMUNITY
Start: 2021-01-27 | End: 2022-06-17

## 2022-05-16 RX ORDER — VEDOLIZUMAB 300 MG/5ML
AS DIRECTED INJECTION, POWDER, LYOPHILIZED, FOR SOLUTION INTRAVENOUS
Status: ACTIVE | COMMUNITY

## 2022-06-27 ENCOUNTER — TELEPHONE ENCOUNTER (OUTPATIENT)
Dept: URBAN - METROPOLITAN AREA CLINIC 92 | Facility: CLINIC | Age: 37
End: 2022-06-27

## 2022-06-27 ENCOUNTER — OFFICE VISIT (OUTPATIENT)
Dept: URBAN - METROPOLITAN AREA SURGERY CENTER 31 | Facility: SURGERY CENTER | Age: 37
End: 2022-06-27
Payer: COMMERCIAL

## 2022-06-27 ENCOUNTER — CLAIMS CREATED FROM THE CLAIM WINDOW (OUTPATIENT)
Dept: URBAN - METROPOLITAN AREA CLINIC 4 | Facility: CLINIC | Age: 37
End: 2022-06-27
Payer: COMMERCIAL

## 2022-06-27 DIAGNOSIS — K50.10 CC (CROHN'S COLITIS): ICD-10-CM

## 2022-06-27 DIAGNOSIS — K63.89 OTHER SPECIFIED DISEASES OF INTESTINE: ICD-10-CM

## 2022-06-27 DIAGNOSIS — K51.80 OTHER ULCERATIVE COLITIS WITHOUT COMPLICATIONS: ICD-10-CM

## 2022-06-27 PROCEDURE — 45380 COLONOSCOPY AND BIOPSY: CPT | Performed by: INTERNAL MEDICINE

## 2022-06-27 PROCEDURE — G8907 PT DOC NO EVENTS ON DISCHARG: HCPCS | Performed by: INTERNAL MEDICINE

## 2022-06-27 PROCEDURE — 88305 TISSUE EXAM BY PATHOLOGIST: CPT | Performed by: PATHOLOGY

## 2022-06-27 RX ORDER — EVE PRIMROSE/LINOLEIC/G-LENIC 1000 MG
CAPSULE ORAL
Qty: 0 | Refills: 0 | Status: ON HOLD | COMMUNITY
Start: 1900-01-01

## 2022-06-27 RX ORDER — CERTOLIZUMAB PEGOL 200 MG/ML
1 ML INJECTION, SOLUTION SUBCUTANEOUS
Qty: 2 | Status: ON HOLD | COMMUNITY
Start: 2021-02-12

## 2022-06-27 RX ORDER — VEDOLIZUMAB 300 MG/5ML
AS DIRECTED INJECTION, POWDER, LYOPHILIZED, FOR SOLUTION INTRAVENOUS
Status: ACTIVE | COMMUNITY

## 2022-07-11 ENCOUNTER — OFFICE VISIT (OUTPATIENT)
Dept: URBAN - METROPOLITAN AREA CLINIC 79 | Facility: CLINIC | Age: 37
End: 2022-07-11
Payer: COMMERCIAL

## 2022-07-11 VITALS
SYSTOLIC BLOOD PRESSURE: 102 MMHG | TEMPERATURE: 97.7 F | HEIGHT: 62 IN | HEART RATE: 82 BPM | BODY MASS INDEX: 31.83 KG/M2 | RESPIRATION RATE: 18 BRPM | DIASTOLIC BLOOD PRESSURE: 76 MMHG | WEIGHT: 173 LBS

## 2022-07-11 DIAGNOSIS — K50.80 CROHN'S COLITIS: ICD-10-CM

## 2022-07-11 PROCEDURE — 96413 CHEMO IV INFUSION 1 HR: CPT | Performed by: INTERNAL MEDICINE

## 2022-07-11 RX ORDER — EVE PRIMROSE/LINOLEIC/G-LENIC 1000 MG
CAPSULE ORAL
Qty: 0 | Refills: 0 | Status: ON HOLD | COMMUNITY
Start: 1900-01-01

## 2022-07-11 RX ORDER — CERTOLIZUMAB PEGOL 200 MG/ML
1 ML INJECTION, SOLUTION SUBCUTANEOUS
Qty: 2 | Status: ON HOLD | COMMUNITY
Start: 2021-02-12

## 2022-07-11 RX ORDER — VEDOLIZUMAB 300 MG/5ML
AS DIRECTED INJECTION, POWDER, LYOPHILIZED, FOR SOLUTION INTRAVENOUS
Status: ACTIVE | COMMUNITY

## 2022-09-09 ENCOUNTER — OFFICE VISIT (OUTPATIENT)
Dept: URBAN - METROPOLITAN AREA CLINIC 79 | Facility: CLINIC | Age: 37
End: 2022-09-09
Payer: COMMERCIAL

## 2022-09-09 VITALS
TEMPERATURE: 97.9 F | RESPIRATION RATE: 20 BRPM | BODY MASS INDEX: 31.83 KG/M2 | DIASTOLIC BLOOD PRESSURE: 80 MMHG | HEART RATE: 76 BPM | SYSTOLIC BLOOD PRESSURE: 107 MMHG | WEIGHT: 173 LBS | HEIGHT: 62 IN

## 2022-09-09 DIAGNOSIS — K50.80 CROHN'S COLITIS: ICD-10-CM

## 2022-09-09 PROCEDURE — 96413 CHEMO IV INFUSION 1 HR: CPT | Performed by: INTERNAL MEDICINE

## 2022-09-09 RX ORDER — VEDOLIZUMAB 300 MG/5ML
AS DIRECTED INJECTION, POWDER, LYOPHILIZED, FOR SOLUTION INTRAVENOUS
Status: ACTIVE | COMMUNITY

## 2022-09-09 RX ORDER — EVE PRIMROSE/LINOLEIC/G-LENIC 1000 MG
CAPSULE ORAL
Qty: 0 | Refills: 0 | Status: ON HOLD | COMMUNITY
Start: 1900-01-01

## 2022-09-09 RX ORDER — CERTOLIZUMAB PEGOL 200 MG/ML
1 ML INJECTION, SOLUTION SUBCUTANEOUS
Qty: 2 | Status: ON HOLD | COMMUNITY
Start: 2021-02-12

## 2022-11-04 ENCOUNTER — OFFICE VISIT (OUTPATIENT)
Dept: URBAN - METROPOLITAN AREA CLINIC 79 | Facility: CLINIC | Age: 37
End: 2022-11-04

## 2022-11-11 ENCOUNTER — CLAIMS CREATED FROM THE CLAIM WINDOW (OUTPATIENT)
Dept: URBAN - METROPOLITAN AREA CLINIC 79 | Facility: CLINIC | Age: 37
End: 2022-11-11
Payer: COMMERCIAL

## 2022-11-11 ENCOUNTER — CLAIMS CREATED FROM THE CLAIM WINDOW (OUTPATIENT)
Dept: URBAN - METROPOLITAN AREA CLINIC 79 | Facility: CLINIC | Age: 37
End: 2022-11-11

## 2022-11-11 ENCOUNTER — OFFICE VISIT (OUTPATIENT)
Dept: URBAN - METROPOLITAN AREA CLINIC 79 | Facility: CLINIC | Age: 37
End: 2022-11-11

## 2022-11-11 VITALS
DIASTOLIC BLOOD PRESSURE: 77 MMHG | BODY MASS INDEX: 32.57 KG/M2 | HEIGHT: 62 IN | HEART RATE: 95 BPM | RESPIRATION RATE: 18 BRPM | WEIGHT: 177 LBS | TEMPERATURE: 97.2 F | SYSTOLIC BLOOD PRESSURE: 117 MMHG

## 2022-11-11 DIAGNOSIS — K50.80 CROHN'S COLITIS: ICD-10-CM

## 2022-11-11 PROCEDURE — 96413 CHEMO IV INFUSION 1 HR: CPT | Performed by: INTERNAL MEDICINE

## 2022-11-11 RX ORDER — EVE PRIMROSE/LINOLEIC/G-LENIC 1000 MG
CAPSULE ORAL
Qty: 0 | Refills: 0 | Status: ON HOLD | COMMUNITY
Start: 1900-01-01

## 2022-11-11 RX ORDER — CERTOLIZUMAB PEGOL 200 MG/ML
1 ML INJECTION, SOLUTION SUBCUTANEOUS
Qty: 2 | Status: ON HOLD | COMMUNITY
Start: 2021-02-12

## 2022-11-11 RX ORDER — VEDOLIZUMAB 300 MG/5ML
AS DIRECTED INJECTION, POWDER, LYOPHILIZED, FOR SOLUTION INTRAVENOUS
Status: ACTIVE | COMMUNITY

## 2022-12-08 ENCOUNTER — TELEPHONE ENCOUNTER (OUTPATIENT)
Dept: URBAN - METROPOLITAN AREA CLINIC 97 | Facility: CLINIC | Age: 37
End: 2022-12-08

## 2022-12-08 RX ORDER — EVE PRIMROSE/LINOLEIC/G-LENIC 1000 MG
CAPSULE ORAL
Qty: 0 | Refills: 0 | Status: ON HOLD | COMMUNITY
Start: 1900-01-01

## 2022-12-08 RX ORDER — VEDOLIZUMAB 300 MG/5ML: AS DIRECTED INJECTION, POWDER, LYOPHILIZED, FOR SOLUTION INTRAVENOUS

## 2022-12-08 RX ORDER — VEDOLIZUMAB 300 MG/5ML
AS DIRECTED INJECTION, POWDER, LYOPHILIZED, FOR SOLUTION INTRAVENOUS
Status: ACTIVE | COMMUNITY

## 2022-12-08 RX ORDER — CERTOLIZUMAB PEGOL 200 MG/ML
1 ML INJECTION, SOLUTION SUBCUTANEOUS
Qty: 2 | Status: ON HOLD | COMMUNITY
Start: 2021-02-12

## 2022-12-27 ENCOUNTER — TELEPHONE ENCOUNTER (OUTPATIENT)
Dept: URBAN - METROPOLITAN AREA CLINIC 79 | Facility: CLINIC | Age: 37
End: 2022-12-27

## 2023-01-30 ENCOUNTER — TELEPHONE ENCOUNTER (OUTPATIENT)
Dept: URBAN - METROPOLITAN AREA CLINIC 19 | Facility: CLINIC | Age: 38
End: 2023-01-30

## 2023-02-08 ENCOUNTER — OFFICE VISIT (OUTPATIENT)
Dept: URBAN - METROPOLITAN AREA CLINIC 19 | Facility: CLINIC | Age: 38
End: 2023-02-08
Payer: COMMERCIAL

## 2023-02-08 VITALS
DIASTOLIC BLOOD PRESSURE: 68 MMHG | OXYGEN SATURATION: 98 % | WEIGHT: 185.6 LBS | HEART RATE: 91 BPM | BODY MASS INDEX: 34.16 KG/M2 | TEMPERATURE: 98.6 F | HEIGHT: 62 IN | SYSTOLIC BLOOD PRESSURE: 112 MMHG

## 2023-02-08 DIAGNOSIS — K50.90 CROHN'S DISEASE: ICD-10-CM

## 2023-02-08 PROCEDURE — 99215 OFFICE O/P EST HI 40 MIN: CPT | Performed by: NURSE PRACTITIONER

## 2023-02-08 RX ORDER — VEDOLIZUMAB 300 MG/5ML
AS DIRECTED INJECTION, POWDER, LYOPHILIZED, FOR SOLUTION INTRAVENOUS
Status: ACTIVE | COMMUNITY

## 2023-02-08 NOTE — HPI-TODAY'S VISIT:
Ms. Vanessa is a 38 yo with Chron's disease who presents today for follow-up.  Last seen in clinic on 3/31/2022. She was started on Entyvio 11/2021.  She states as soon as she started the Entyvio, within a week she noticed significant symptom improvement and has remained without symptoms ever since. BMs are twice a day and fim. No straining. No bloody or black stools. No abdominal pain. No dysphagia, reflux, nausea, or vomiting. Her last dose of Entyvio however was end of November 2022. She was due for her next dose on Jan 6, 2023 but unfortunately her insurance denied Entyvio stating they want her to try and fail Stelara first, even though Entyvio works very well for her. She has been without treatment and thankfully has not had any flare-up as of yet.     Prior history is summarized below: -On 4/12/2011 she had a colonoscopy that showed "inflammation and friability of the distal sigmoid and rectum" Pathology showed "moderately active procitis" with no evidence of granulomas, viral inclusions, or dysplasia. After the procedure she was prescribed anusol and sulfasalazine. -On 10/16/2018 she had a colonoscopy that showed discontinous areas of ulcerated mucosa in the rectum, sigmoid colon, ascending colon, and cecum; the transverse colon, descending colon and terminal ileum were normal. Pathology of the cecum, ascending colon, transverse colon, sigmoid colon, and rectum showed chronic active colitis; pathology of the descending colon was unremarkable. -Labs from 10/23/2018 showed quantiferon TB was negative, vitamin D 20.1, Hep A ab total negative, Hep B s Ab negative, Hep B c Ab total negative, Hep B s AB consistent with immunity, Hep C Ab negative- she reports completing Vitamin D Rx provided -On 10/30/2018 she had a MRE that was negative other than "very large amount of stool noted throughout the colon from the cecum through the distal sigmoid colon without stricture noted." -She started cimiza on 2/13/2019 and her last dose was on 3/13/2019. -She reports having return of flare symptoms around 10/2020. At last clinic visit we prescribed prednisone taper and we reports she wanted she wanted to try cimzia again. -On 1/21/2021 Hgb 13.0, Cr 0.86, Alb 4.5, AST 18, ALT 13, Alk phos 75, T bili 0.3, CRP 3 (0-10), Quantiferon-TB negative, Hepatitis B s Ab positive, Hep B s Ag negative, Hep B c Ab total negative, Hep C Ab negative, GPP stool test was negative.

## 2023-02-15 ENCOUNTER — LAB OUTSIDE AN ENCOUNTER (OUTPATIENT)
Dept: URBAN - METROPOLITAN AREA CLINIC 19 | Facility: CLINIC | Age: 38
End: 2023-02-15

## 2023-02-15 LAB
ABSOLUTE NRBC COUNT: 0
AG RATIO: 1
ALBUMIN LEVEL: 4.2
ALK PHOS: 64
ALT: 18
ANION GAP: 9
AST: 25
BILIRUBIN TOTAL: 0.3
BUN/CREAT RATIO: 11
BUN: 9
CALCIUM LEVEL: 8.5
CHLORIDE LEVEL: 107
CO2 LEVEL: 22
CREATININE LEVEL: 0.8
CRP: 0.5
FERRITIN LEVEL: 7.8
FOLATE LEVEL: 12.7
GFR 2021: >60
GLUCOSE LEVEL: 92
HCT: 40.5
HGB: 13.3
MCH: 27.6
MCHC: 32.8
MCV: 84
MPV: 9.2
NRBC AUTO: 0
OSMO (CALC): 274
PERFORMING LAB: (no result)
PLATELETS: 306
POTASSIUM LEVEL: 3.9
PROTEIN TOTAL: 7.2
RBC: 4.82
RDW: 13.2
SODIUM LEVEL: 138
VITAMIN B12 LEVEL: 443
VITAMIN D 25 OH: 17
WBC: 7.5

## 2023-02-16 ENCOUNTER — TELEPHONE ENCOUNTER (OUTPATIENT)
Dept: URBAN - METROPOLITAN AREA CLINIC 19 | Facility: CLINIC | Age: 38
End: 2023-02-16

## 2023-02-16 LAB
IRON LEVEL: 80
IRON SAT: 22
PERFORMING LAB: (no result)
TIBC: 363

## 2023-02-17 ENCOUNTER — TELEPHONE ENCOUNTER (OUTPATIENT)
Dept: URBAN - METROPOLITAN AREA CLINIC 19 | Facility: CLINIC | Age: 38
End: 2023-02-17

## 2023-02-17 PROBLEM — 87522002: Status: ACTIVE | Noted: 2023-02-17

## 2023-02-17 RX ORDER — VEDOLIZUMAB 300 MG/5ML
AS DIRECTED INJECTION, POWDER, LYOPHILIZED, FOR SOLUTION INTRAVENOUS
Status: ACTIVE | COMMUNITY

## 2023-02-19 LAB
PERFORMING LAB: (no result)
QUANTIFERON-TB PLUS, 1T: (no result)

## 2023-03-03 PROBLEM — 7620006: Status: ACTIVE | Noted: 2021-01-21

## 2023-03-13 ENCOUNTER — TELEPHONE ENCOUNTER (OUTPATIENT)
Dept: URBAN - METROPOLITAN AREA CLINIC 6 | Facility: CLINIC | Age: 38
End: 2023-03-13

## 2023-03-22 ENCOUNTER — TELEPHONE ENCOUNTER (OUTPATIENT)
Dept: URBAN - METROPOLITAN AREA CLINIC 23 | Facility: CLINIC | Age: 38
End: 2023-03-22

## 2023-03-24 ENCOUNTER — OFFICE VISIT (OUTPATIENT)
Dept: URBAN - METROPOLITAN AREA CLINIC 79 | Facility: CLINIC | Age: 38
End: 2023-03-24
Payer: COMMERCIAL

## 2023-03-24 VITALS
DIASTOLIC BLOOD PRESSURE: 80 MMHG | HEIGHT: 62 IN | RESPIRATION RATE: 20 BRPM | WEIGHT: 186 LBS | HEART RATE: 88 BPM | TEMPERATURE: 97.3 F | SYSTOLIC BLOOD PRESSURE: 136 MMHG | BODY MASS INDEX: 34.23 KG/M2

## 2023-03-24 DIAGNOSIS — K50.80 CROHN'S COLITIS: ICD-10-CM

## 2023-03-24 PROCEDURE — 96413 CHEMO IV INFUSION 1 HR: CPT | Performed by: INTERNAL MEDICINE

## 2023-03-24 RX ORDER — VEDOLIZUMAB 300 MG/5ML
AS DIRECTED INJECTION, POWDER, LYOPHILIZED, FOR SOLUTION INTRAVENOUS
Status: ACTIVE | COMMUNITY

## 2023-05-19 ENCOUNTER — OFFICE VISIT (OUTPATIENT)
Dept: URBAN - METROPOLITAN AREA CLINIC 79 | Facility: CLINIC | Age: 38
End: 2023-05-19
Payer: COMMERCIAL

## 2023-05-19 VITALS
WEIGHT: 185 LBS | HEART RATE: 81 BPM | RESPIRATION RATE: 20 BRPM | SYSTOLIC BLOOD PRESSURE: 111 MMHG | TEMPERATURE: 97.3 F | BODY MASS INDEX: 34.04 KG/M2 | HEIGHT: 62 IN | DIASTOLIC BLOOD PRESSURE: 76 MMHG

## 2023-05-19 DIAGNOSIS — K50.80 CROHN'S DISEASE OF BOTH SMALL AND LARGE INTESTINE: ICD-10-CM

## 2023-05-19 PROCEDURE — 96413 CHEMO IV INFUSION 1 HR: CPT | Performed by: INTERNAL MEDICINE

## 2023-05-19 RX ORDER — VEDOLIZUMAB 300 MG/5ML
AS DIRECTED INJECTION, POWDER, LYOPHILIZED, FOR SOLUTION INTRAVENOUS
Status: ACTIVE | COMMUNITY

## 2023-06-02 ENCOUNTER — OFFICE VISIT (OUTPATIENT)
Dept: URBAN - METROPOLITAN AREA CLINIC 19 | Facility: CLINIC | Age: 38
End: 2023-06-02
Payer: COMMERCIAL

## 2023-06-02 VITALS
HEIGHT: 62 IN | SYSTOLIC BLOOD PRESSURE: 102 MMHG | OXYGEN SATURATION: 97 % | WEIGHT: 196.8 LBS | DIASTOLIC BLOOD PRESSURE: 68 MMHG | BODY MASS INDEX: 36.22 KG/M2 | HEART RATE: 80 BPM | TEMPERATURE: 97.7 F

## 2023-06-02 DIAGNOSIS — D50.9 IRON DEFICIENCY ANEMIA, UNSPECIFIED IRON DEFICIENCY ANEMIA TYPE: ICD-10-CM

## 2023-06-02 DIAGNOSIS — K50.90 CROHN'S DISEASE: ICD-10-CM

## 2023-06-02 PROCEDURE — 99214 OFFICE O/P EST MOD 30 MIN: CPT | Performed by: NURSE PRACTITIONER

## 2023-06-02 RX ORDER — VEDOLIZUMAB 300 MG/5ML
AS DIRECTED INJECTION, POWDER, LYOPHILIZED, FOR SOLUTION INTRAVENOUS
Status: ACTIVE | COMMUNITY

## 2023-06-02 NOTE — HPI-TODAY'S VISIT:
Ms. Vanessa is a 37-year-old female with Crohn's disease who presents today for follow-up.  Last seen in clinic by me on 2/8/2023.  She was started on Entyvio 11/2021.  Stated as soon as she started Entyvio noticed significant symptom improvement within a week. Her last dose of Entyvio was end of November 2022.  She was due for her next dose on January 6, 2023 but unfortunately her insurance denied and stating that they want her to try and fail Stelara first even though Entyvio was working very well for her.  She had been without treatment at her last follow-up and thankfully was without any signs of flareups. Labs were checked and her CBC and CMP were overall unremarkable.  Iron level 80, TIBC 363, 22% iron saturation, ferritin low 7.8, vitamin B12 and folate were normal, vitamin D 17, CRP normal.  TB QuantiFERON negative. Was started on ferrous gluconate daily  Entyvio eventually got approved and she resumed infusions on 3/24/2023 which she receives at our [place] office. 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  She reports doing very well with Entyvio. Denies any abdominal pain. Has been having #4 bristol stool scale type stools about twice a day. No bloody or black stools. No straining. Appetite is good.   Last colonoscopy was done by Dr Min on 6/27/2022 Path from rectum with patchy mild chronic inactive colitis, consistent with quiescent inflammatory bowel disease negative for infection dysplasia or malignancy.  Terminal ileum, cecum, ascending colon, transverse colon, descending colon, sigmoid colon biopsies were all negative.

## 2023-06-05 LAB
A/G RATIO: 1.8
ALBUMIN: 4.6
ALKALINE PHOSPHATASE: 55
ALT (SGPT): 11
AST (SGOT): 15
BILIRUBIN, TOTAL: 0.3
BUN/CREATININE RATIO: (no result)
BUN: 12
C-REACTIVE PROTEIN, QUANT: 3.2
CALCIUM: 9.2
CARBON DIOXIDE, TOTAL: 27
CHLORIDE: 104
CREATININE: 0.87
EGFR: 88
GLOBULIN, TOTAL: 2.6
GLUCOSE: 90
HEMATOCRIT: 40.5
HEMOGLOBIN: 13.4
MCH: 28.2
MCHC: 33.1
MCV: 85.1
MPV: 9.3
PLATELET COUNT: 299
POTASSIUM: 4
PROTEIN, TOTAL: 7.2
RDW: 13.2
RED BLOOD CELL COUNT: 4.76
SODIUM: 139
WHITE BLOOD CELL COUNT: 7.3

## 2023-07-13 ENCOUNTER — OFFICE VISIT (OUTPATIENT)
Dept: URBAN - METROPOLITAN AREA CLINIC 79 | Facility: CLINIC | Age: 38
End: 2023-07-13
Payer: COMMERCIAL

## 2023-07-13 VITALS
TEMPERATURE: 97.9 F | DIASTOLIC BLOOD PRESSURE: 86 MMHG | HEIGHT: 62 IN | WEIGHT: 185 LBS | HEART RATE: 93 BPM | RESPIRATION RATE: 20 BRPM | BODY MASS INDEX: 34.04 KG/M2 | SYSTOLIC BLOOD PRESSURE: 135 MMHG

## 2023-07-13 DIAGNOSIS — K50.80 CROHN'S COLITIS: ICD-10-CM

## 2023-07-13 PROCEDURE — 96413 CHEMO IV INFUSION 1 HR: CPT | Performed by: INTERNAL MEDICINE

## 2023-07-13 RX ORDER — VEDOLIZUMAB 300 MG/5ML
AS DIRECTED INJECTION, POWDER, LYOPHILIZED, FOR SOLUTION INTRAVENOUS
Status: ACTIVE | COMMUNITY

## 2023-07-31 ENCOUNTER — TELEPHONE ENCOUNTER (OUTPATIENT)
Dept: URBAN - METROPOLITAN AREA CLINIC 97 | Facility: CLINIC | Age: 38
End: 2023-07-31

## 2023-09-05 ENCOUNTER — TELEPHONE ENCOUNTER (OUTPATIENT)
Dept: URBAN - METROPOLITAN AREA CLINIC 23 | Facility: CLINIC | Age: 38
End: 2023-09-05

## 2023-09-11 ENCOUNTER — OFFICE VISIT (OUTPATIENT)
Dept: URBAN - METROPOLITAN AREA CLINIC 79 | Facility: CLINIC | Age: 38
End: 2023-09-11
Payer: COMMERCIAL

## 2023-09-11 VITALS
SYSTOLIC BLOOD PRESSURE: 113 MMHG | HEART RATE: 86 BPM | HEIGHT: 62 IN | DIASTOLIC BLOOD PRESSURE: 67 MMHG | WEIGHT: 190 LBS | RESPIRATION RATE: 20 BRPM | BODY MASS INDEX: 34.96 KG/M2 | TEMPERATURE: 97.9 F

## 2023-09-11 DIAGNOSIS — K50.80 CROHN'S DISEASE OF BOTH SMALL AND LARGE INTESTINE: ICD-10-CM

## 2023-09-11 PROCEDURE — 96413 CHEMO IV INFUSION 1 HR: CPT | Performed by: INTERNAL MEDICINE

## 2023-09-11 RX ORDER — VEDOLIZUMAB 300 MG/5ML
AS DIRECTED INJECTION, POWDER, LYOPHILIZED, FOR SOLUTION INTRAVENOUS
Status: ACTIVE | COMMUNITY

## 2023-11-10 ENCOUNTER — OFFICE VISIT (OUTPATIENT)
Dept: URBAN - METROPOLITAN AREA CLINIC 79 | Facility: CLINIC | Age: 38
End: 2023-11-10
Payer: COMMERCIAL

## 2023-11-10 VITALS
SYSTOLIC BLOOD PRESSURE: 121 MMHG | WEIGHT: 193 LBS | HEIGHT: 62 IN | DIASTOLIC BLOOD PRESSURE: 88 MMHG | HEART RATE: 84 BPM | RESPIRATION RATE: 18 BRPM | BODY MASS INDEX: 35.51 KG/M2 | TEMPERATURE: 97.8 F

## 2023-11-10 DIAGNOSIS — K50.80 CROHN'S DISEASE OF BOTH SMALL AND LARGE INTESTINE: ICD-10-CM

## 2023-11-10 PROCEDURE — 96413 CHEMO IV INFUSION 1 HR: CPT | Performed by: INTERNAL MEDICINE

## 2023-11-10 RX ORDER — VEDOLIZUMAB 300 MG/5ML
AS DIRECTED INJECTION, POWDER, LYOPHILIZED, FOR SOLUTION INTRAVENOUS
Status: ACTIVE | COMMUNITY

## 2023-12-06 ENCOUNTER — OFFICE VISIT (OUTPATIENT)
Dept: URBAN - METROPOLITAN AREA CLINIC 19 | Facility: CLINIC | Age: 38
End: 2023-12-06

## 2023-12-12 ENCOUNTER — CLAIMS CREATED FROM THE CLAIM WINDOW (OUTPATIENT)
Dept: URBAN - METROPOLITAN AREA CLINIC 19 | Facility: CLINIC | Age: 38
End: 2023-12-12
Payer: COMMERCIAL

## 2023-12-12 ENCOUNTER — LAB OUTSIDE AN ENCOUNTER (OUTPATIENT)
Dept: URBAN - METROPOLITAN AREA CLINIC 19 | Facility: CLINIC | Age: 38
End: 2023-12-12

## 2023-12-12 VITALS
OXYGEN SATURATION: 99 % | BODY MASS INDEX: 36.58 KG/M2 | HEIGHT: 62 IN | SYSTOLIC BLOOD PRESSURE: 122 MMHG | HEART RATE: 100 BPM | DIASTOLIC BLOOD PRESSURE: 80 MMHG | WEIGHT: 198.8 LBS | TEMPERATURE: 97.4 F

## 2023-12-12 DIAGNOSIS — K50.80 CROHN'S COLITIS: ICD-10-CM

## 2023-12-12 DIAGNOSIS — K59.01 CONSTIPATION: ICD-10-CM

## 2023-12-12 DIAGNOSIS — K50.90 CROHN'S DISEASE: ICD-10-CM

## 2023-12-12 DIAGNOSIS — D50.9 IRON DEFICIENCY ANEMIA, UNSPECIFIED IRON DEFICIENCY ANEMIA TYPE: ICD-10-CM

## 2023-12-12 PROCEDURE — 99215 OFFICE O/P EST HI 40 MIN: CPT | Performed by: NURSE PRACTITIONER

## 2023-12-12 RX ORDER — VEDOLIZUMAB 300 MG/5ML
AS DIRECTED INJECTION, POWDER, LYOPHILIZED, FOR SOLUTION INTRAVENOUS
Status: ACTIVE | COMMUNITY

## 2023-12-12 RX ORDER — IRON FUM,PS CMP/VIT C/NIACIN 125-40-3MG
1 CAPSULE BETWEEN MEALS CAPSULE ORAL ONCE A DAY
Qty: 90 CAPSULE | Refills: 3 | OUTPATIENT
Start: 2023-12-12

## 2023-12-12 NOTE — HPI-TODAY'S VISIT:
Ms. Vanessa is a 38-year-old female with Crohn's disease who presents today for 6 month follow-up.   She was started on Entyvio 11/2021. Stated as soon as she started Entyvio noticed significant symptom improvement within a week.        Had a dose of Entyvio end of November 2022. She was due for her next dose on January 6, 2023 but unfortunately her insurance denied and stating that they wanted her to try and fail Stelara first even though Entyvio was working very well for her. She had been without treatment at her last follow-up and thankfully was without any signs of flareups.        2/2023 labs - CBC and CMP were overall unremarkable. Iron level 80, TIBC 363, 22% iron saturation, ferritin low 7.8, vitamin B12 and folate were normal, vitamin D 17, CRP normal. TB Quant negative.        Was ordered to start on ferrous gluconate daily but she never took it.        Entyvio eventually got approved and she resumed infusions on 3/24/2023 which she receives at our [place] office. 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               Last colonoscopy was done by Dr Min on 6/27/2022        Path from rectum with patchy mild chronic inactive colitis, consistent with quiescent inflammatory bowel disease negative for infection dysplasia or malignancy. Terminal ileum, cecum, ascending colon, transverse colon, descending colon, sigmoid colon biopsies were all negative.   She continues to do very well on Entyvio. No rectal bleeding and no more abdominal pain. Reports she has been having constipation. Has a #1-2 bristol type BM everyday.

## 2023-12-13 ENCOUNTER — WEB ENCOUNTER (OUTPATIENT)
Dept: URBAN - METROPOLITAN AREA CLINIC 19 | Facility: CLINIC | Age: 38
End: 2023-12-13

## 2023-12-13 LAB
A/G RATIO: 1.7
ALBUMIN: 4.3
ALKALINE PHOSPHATASE: 58
ALT (SGPT): 11
AST (SGOT): 14
BILIRUBIN, TOTAL: 0.5
BUN/CREATININE RATIO: (no result)
BUN: 9
C-REACTIVE PROTEIN, QUANT: 7.9
CALCIUM: 9.5
CARBON DIOXIDE, TOTAL: 28
CHLORIDE: 102
CREATININE: 0.87
EGFR: 87
FERRITIN, SERUM: 14
GLOBULIN, TOTAL: 2.5
GLUCOSE: 94
HEMATOCRIT: 45.6
HEMOGLOBIN: 15.2
IRON BIND.CAP.(TIBC): 399
IRON SATURATION: 32
IRON: 127
MCH: 28.6
MCHC: 33.3
MCV: 85.9
MPV: 9.6
PLATELET COUNT: 349
POTASSIUM: 4.3
PROTEIN, TOTAL: 6.8
RDW: 13.2
RED BLOOD CELL COUNT: 5.31
SODIUM: 139
WHITE BLOOD CELL COUNT: 7.9

## 2024-01-03 ENCOUNTER — TELEPHONE ENCOUNTER (OUTPATIENT)
Dept: URBAN - METROPOLITAN AREA CLINIC 97 | Facility: CLINIC | Age: 39
End: 2024-01-03

## 2024-01-03 RX ORDER — VEDOLIZUMAB 300 MG/5ML
INFUSE EVERY 8 WEEKS INJECTION, POWDER, LYOPHILIZED, FOR SOLUTION INTRAVENOUS
Qty: 3 | Refills: 3 | OUTPATIENT
Start: 2024-01-03 | End: 2024-08-30

## 2024-01-11 ENCOUNTER — OFFICE VISIT (OUTPATIENT)
Dept: URBAN - METROPOLITAN AREA CLINIC 91 | Facility: CLINIC | Age: 39
End: 2024-01-11
Payer: COMMERCIAL

## 2024-01-11 VITALS
HEART RATE: 84 BPM | RESPIRATION RATE: 18 BRPM | BODY MASS INDEX: 36.99 KG/M2 | WEIGHT: 201 LBS | HEIGHT: 62 IN | SYSTOLIC BLOOD PRESSURE: 103 MMHG | DIASTOLIC BLOOD PRESSURE: 72 MMHG | TEMPERATURE: 97.6 F

## 2024-01-11 DIAGNOSIS — K50.80 CROHN'S COLITIS: ICD-10-CM

## 2024-01-11 PROCEDURE — 96413 CHEMO IV INFUSION 1 HR: CPT | Performed by: INTERNAL MEDICINE

## 2024-01-11 RX ORDER — VEDOLIZUMAB 300 MG/5ML
INFUSE EVERY 8 WEEKS INJECTION, POWDER, LYOPHILIZED, FOR SOLUTION INTRAVENOUS
Qty: 3 | Refills: 3 | Status: ACTIVE | COMMUNITY
Start: 2024-01-03 | End: 2024-08-30

## 2024-01-11 RX ORDER — IRON FUM,PS CMP/VIT C/NIACIN 125-40-3MG
1 CAPSULE BETWEEN MEALS CAPSULE ORAL ONCE A DAY
Qty: 90 CAPSULE | Refills: 3 | Status: ACTIVE | COMMUNITY
Start: 2023-12-12

## 2024-01-11 RX ORDER — VEDOLIZUMAB 300 MG/5ML
AS DIRECTED INJECTION, POWDER, LYOPHILIZED, FOR SOLUTION INTRAVENOUS
Status: ACTIVE | COMMUNITY

## 2024-02-23 ENCOUNTER — COLON (OUTPATIENT)
Dept: URBAN - METROPOLITAN AREA SURGERY CENTER 31 | Facility: SURGERY CENTER | Age: 39
End: 2024-02-23

## 2024-02-23 ENCOUNTER — LAB (OUTPATIENT)
Dept: URBAN - METROPOLITAN AREA CLINIC 4 | Facility: CLINIC | Age: 39
End: 2024-02-23
Payer: COMMERCIAL

## 2024-02-23 DIAGNOSIS — K63.89 OTHER SPECIFIED DISEASES OF INTESTINE: ICD-10-CM

## 2024-02-23 PROCEDURE — 88305 TISSUE EXAM BY PATHOLOGIST: CPT | Performed by: PATHOLOGY

## 2024-02-23 RX ORDER — VEDOLIZUMAB 300 MG/5ML
AS DIRECTED INJECTION, POWDER, LYOPHILIZED, FOR SOLUTION INTRAVENOUS
Status: ACTIVE | COMMUNITY

## 2024-02-23 RX ORDER — IRON FUM,PS CMP/VIT C/NIACIN 125-40-3MG
1 CAPSULE BETWEEN MEALS CAPSULE ORAL ONCE A DAY
Qty: 90 CAPSULE | Refills: 3 | Status: ACTIVE | COMMUNITY
Start: 2023-12-12

## 2024-02-23 RX ORDER — VEDOLIZUMAB 300 MG/5ML
INFUSE EVERY 8 WEEKS INJECTION, POWDER, LYOPHILIZED, FOR SOLUTION INTRAVENOUS
Qty: 3 | Refills: 3 | Status: ACTIVE | COMMUNITY
Start: 2024-01-03 | End: 2024-08-30

## 2024-03-07 ENCOUNTER — ENT (OUTPATIENT)
Dept: URBAN - METROPOLITAN AREA CLINIC 91 | Facility: CLINIC | Age: 39
End: 2024-03-07
Payer: COMMERCIAL

## 2024-03-07 VITALS
BODY MASS INDEX: 36.99 KG/M2 | TEMPERATURE: 97.5 F | SYSTOLIC BLOOD PRESSURE: 103 MMHG | HEART RATE: 96 BPM | WEIGHT: 201 LBS | RESPIRATION RATE: 18 BRPM | HEIGHT: 62 IN | DIASTOLIC BLOOD PRESSURE: 70 MMHG

## 2024-03-07 DIAGNOSIS — K50.80 CROHN'S DISEASE OF BOTH SMALL AND LARGE INTESTINE: ICD-10-CM

## 2024-03-07 PROCEDURE — 96413 CHEMO IV INFUSION 1 HR: CPT | Performed by: INTERNAL MEDICINE

## 2024-03-07 RX ORDER — IRON FUM,PS CMP/VIT C/NIACIN 125-40-3MG
1 CAPSULE BETWEEN MEALS CAPSULE ORAL ONCE A DAY
Qty: 90 CAPSULE | Refills: 3 | Status: ACTIVE | COMMUNITY
Start: 2023-12-12

## 2024-03-07 RX ORDER — VEDOLIZUMAB 300 MG/5ML
INFUSE EVERY 8 WEEKS INJECTION, POWDER, LYOPHILIZED, FOR SOLUTION INTRAVENOUS
Qty: 3 | Refills: 3 | Status: ACTIVE | COMMUNITY
Start: 2024-01-03 | End: 2024-08-30

## 2024-03-07 RX ORDER — VEDOLIZUMAB 300 MG/5ML
AS DIRECTED INJECTION, POWDER, LYOPHILIZED, FOR SOLUTION INTRAVENOUS
Status: ACTIVE | COMMUNITY

## 2024-03-21 ENCOUNTER — OV EP (OUTPATIENT)
Dept: URBAN - METROPOLITAN AREA CLINIC 19 | Facility: CLINIC | Age: 39
End: 2024-03-21
Payer: COMMERCIAL

## 2024-03-21 VITALS
HEIGHT: 62 IN | SYSTOLIC BLOOD PRESSURE: 124 MMHG | TEMPERATURE: 98.2 F | WEIGHT: 198.2 LBS | DIASTOLIC BLOOD PRESSURE: 78 MMHG | BODY MASS INDEX: 36.47 KG/M2 | OXYGEN SATURATION: 98 % | HEART RATE: 89 BPM

## 2024-03-21 DIAGNOSIS — K50.90 CROHN'S DISEASE: ICD-10-CM

## 2024-03-21 PROCEDURE — 99213 OFFICE O/P EST LOW 20 MIN: CPT | Performed by: NURSE PRACTITIONER

## 2024-03-21 RX ORDER — VEDOLIZUMAB 300 MG/5ML
INFUSE EVERY 8 WEEKS INJECTION, POWDER, LYOPHILIZED, FOR SOLUTION INTRAVENOUS
Qty: 3 | Refills: 3 | Status: ACTIVE | COMMUNITY
Start: 2024-01-03 | End: 2024-08-30

## 2024-03-21 RX ORDER — IRON FUM,PS CMP/VIT C/NIACIN 125-40-3MG
1 CAPSULE BETWEEN MEALS CAPSULE ORAL ONCE A DAY
Qty: 90 CAPSULE | Refills: 3 | Status: ACTIVE | COMMUNITY
Start: 2023-12-12

## 2024-03-21 RX ORDER — VEDOLIZUMAB 300 MG/5ML
AS DIRECTED INJECTION, POWDER, LYOPHILIZED, FOR SOLUTION INTRAVENOUS
Status: ACTIVE | COMMUNITY

## 2024-03-21 NOTE — HPI-TODAY'S VISIT:
Ms. Vanessa is a 38-year-old female with Crohn's disease currently on Entyvio who presents today for 3mo follow-up. Last seen in GI clinic 12/12/2023. Labs at the time with normal Hgb 15.2, iron 27, , 32% iron sat, ferritin 14, CMP and CRP were unremarkable. Started her on Integra daily. She reports since starting Integra she experienced abdominal pain the first 3 weeks. No longer having pain but notices BMs are less frequent. Hard stools but not straining. Has to sit longer. Was having a BM daily 2-3 times/day prior to this. No blood in stool but darker since starting oral iron.  2/23/2024 colonoscopy with Dr. Min - entire portion of the ileum was normal, entire colon was normal. Biopsies from TI and random colon biopsies were negative. 2-year follow-up given    -----------------------------------------------        Prior history is summarized below: -On 4/12/2011 she had a colonoscopy that showed "inflammation and friability of the distal sigmoid and rectum" Pathology showed "moderately active procitis" with no evidence of granulomas, viral inclusions, or dysplasia. After the procedure she was prescribed anusol and sulfasalazine.  -On 10/16/2018 she had a colonoscopy that showed discontinous areas of ulcerated mucosa in the rectum, sigmoid colon, ascending colon, and cecum; the transverse colon, descending colon and terminal ileum were normal. Pathology of the cecum, ascending colon, transverse colon, sigmoid colon, and rectum showed chronic active colitis; pathology of the descending colon was unremarkable. -Labs from 10/23/2018 showed quantiferon TB was negative, vitamin D 20.1, Hep A ab total negative, Hep B s Ab negative, Hep B c Ab total negative, Hep B s AB consistent with immunity, Hep C Ab negative- she reports completing Vitamin D Rx provided -On 10/30/2018 she had a MRE that was negative other than "very large amount of stool noted throughout the colon from the cecum through the distal sigmoid colon without stricture noted." -She started cimiza on 2/13/2019  -Switched to Entyvio 10/21/2021. Stated as soon as she started Entyvio noticed significant symptom improvement within a week.  -Colonoscopy by Dr Min on 6/27/2022: Path from rectum with patchy mild chronic inactive colitis, consistent with quiescent inflammatory bowel disease negative for infection dysplasia or malignancy. Terminal ileum, cecum, ascending colon, transverse colon, descending colon, sigmoid colon biopsies were all negative. -She was due for her next dose on January 6, 2023 but unfortunately her insurance denied and stating that they wanted her to try and fail Stelara first even though Entyvio was working very well for her. She had been without treatment at her last follow-up and thankfully was without any signs of flareups. 2/2023 labs - CBC and CMP were overall unremarkable. Iron level 80, TIBC 363, 22% iron saturation, ferritin low 7.8, vitamin B12 and folate were normal, vitamin D 17, CRP normal. TB Quant negative. Was ordered to start on ferrous gluconate daily but she never took it. -Entyvio eventually got approved and she resumed infusions on 3/24/2023 which she receives at our [place] office.

## 2024-03-22 LAB
A/G RATIO: 2
ALBUMIN: 4.5
ALKALINE PHOSPHATASE: 59
ALT (SGPT): 12
AST (SGOT): 18
BILIRUBIN, TOTAL: 0.3
BUN/CREATININE RATIO: (no result)
BUN: 9
C-REACTIVE PROTEIN, QUANT: 5.6
CALCIUM: 9.1
CARBON DIOXIDE, TOTAL: 23
CHLORIDE: 104
CREATININE: 0.77
EGFR: 101
FERRITIN, SERUM: 31
GLOBULIN, TOTAL: 2.3
GLUCOSE: 77
HEMATOCRIT: 44.6
HEMOGLOBIN: 15.7
IRON BIND.CAP.(TIBC): 293
IRON SATURATION: 32
IRON: 93
MCH: 30.3
MCHC: 35.2
MCV: 85.9
MPV: 9.3
PLATELET COUNT: 330
POTASSIUM: 4.1
PROTEIN, TOTAL: 6.8
RDW: 12.9
RED BLOOD CELL COUNT: 5.19
SODIUM: 138
VITAMIN D,25-OH,TOTAL,IA: 29
WHITE BLOOD CELL COUNT: 9.1

## 2024-04-29 ENCOUNTER — ENT (OUTPATIENT)
Dept: URBAN - METROPOLITAN AREA CLINIC 18 | Facility: CLINIC | Age: 39
End: 2024-04-29

## 2024-04-29 ENCOUNTER — ENT (OUTPATIENT)
Dept: URBAN - METROPOLITAN AREA CLINIC 18 | Facility: CLINIC | Age: 39
End: 2024-04-29
Payer: COMMERCIAL

## 2024-04-29 VITALS
DIASTOLIC BLOOD PRESSURE: 73 MMHG | WEIGHT: 193 LBS | SYSTOLIC BLOOD PRESSURE: 107 MMHG | RESPIRATION RATE: 18 BRPM | TEMPERATURE: 98.2 F | HEIGHT: 62 IN | HEART RATE: 92 BPM | BODY MASS INDEX: 35.51 KG/M2

## 2024-04-29 DIAGNOSIS — K50.80 CROHN'S COLITIS: ICD-10-CM

## 2024-04-29 PROCEDURE — 96413 CHEMO IV INFUSION 1 HR: CPT | Performed by: INTERNAL MEDICINE

## 2024-04-29 RX ORDER — VEDOLIZUMAB 300 MG/5ML
INFUSE EVERY 8 WEEKS INJECTION, POWDER, LYOPHILIZED, FOR SOLUTION INTRAVENOUS
Qty: 3 | Refills: 3 | Status: ACTIVE | COMMUNITY
Start: 2024-01-03 | End: 2024-08-30

## 2024-04-29 RX ORDER — VEDOLIZUMAB 300 MG/5ML
AS DIRECTED INJECTION, POWDER, LYOPHILIZED, FOR SOLUTION INTRAVENOUS
Status: ACTIVE | COMMUNITY

## 2024-04-29 RX ORDER — IRON FUM,PS CMP/VIT C/NIACIN 125-40-3MG
1 CAPSULE BETWEEN MEALS CAPSULE ORAL ONCE A DAY
Qty: 90 CAPSULE | Refills: 3 | Status: ACTIVE | COMMUNITY
Start: 2023-12-12

## 2024-06-26 ENCOUNTER — OFFICE VISIT (OUTPATIENT)
Dept: URBAN - METROPOLITAN AREA CLINIC 97 | Facility: CLINIC | Age: 39
End: 2024-06-26
Payer: COMMERCIAL

## 2024-06-26 VITALS
HEART RATE: 92 BPM | SYSTOLIC BLOOD PRESSURE: 131 MMHG | TEMPERATURE: 97.8 F | DIASTOLIC BLOOD PRESSURE: 83 MMHG | WEIGHT: 195 LBS | BODY MASS INDEX: 35.88 KG/M2 | RESPIRATION RATE: 18 BRPM | HEIGHT: 62 IN

## 2024-06-26 DIAGNOSIS — K50.80 CROHN'S COLITIS: ICD-10-CM

## 2024-06-26 PROCEDURE — 96413 CHEMO IV INFUSION 1 HR: CPT | Performed by: INTERNAL MEDICINE

## 2024-06-26 RX ORDER — VEDOLIZUMAB 300 MG/5ML
INFUSE EVERY 8 WEEKS INJECTION, POWDER, LYOPHILIZED, FOR SOLUTION INTRAVENOUS
Qty: 3 | Refills: 3 | Status: ACTIVE | COMMUNITY
Start: 2024-01-03 | End: 2024-08-30

## 2024-06-26 RX ORDER — IRON FUM,PS CMP/VIT C/NIACIN 125-40-3MG
1 CAPSULE BETWEEN MEALS CAPSULE ORAL ONCE A DAY
Qty: 90 CAPSULE | Refills: 3 | Status: ACTIVE | COMMUNITY
Start: 2023-12-12

## 2024-06-26 RX ORDER — VEDOLIZUMAB 300 MG/5ML
AS DIRECTED INJECTION, POWDER, LYOPHILIZED, FOR SOLUTION INTRAVENOUS
Status: ACTIVE | COMMUNITY

## 2024-07-20 ENCOUNTER — WEB ENCOUNTER (OUTPATIENT)
Dept: URBAN - METROPOLITAN AREA CLINIC 19 | Facility: CLINIC | Age: 39
End: 2024-07-20

## 2024-07-22 ENCOUNTER — WEB ENCOUNTER (OUTPATIENT)
Dept: URBAN - METROPOLITAN AREA CLINIC 19 | Facility: CLINIC | Age: 39
End: 2024-07-22

## 2024-07-26 LAB
A/G RATIO: 1.7
ALBUMIN: 4.1
ALKALINE PHOSPHATASE: 54
ALT (SGPT): 14
AST (SGOT): 15
BILIRUBIN, TOTAL: 0.5
BUN/CREATININE RATIO: (no result)
BUN: 7
C-REACTIVE PROTEIN, QUANT: 5.3
CALCIUM: 8.9
CARBON DIOXIDE, TOTAL: 27
CHLORIDE: 104
CREATININE: 0.77
EGFR: 101
FERRITIN, SERUM: 18
GLOBULIN, TOTAL: 2.4
GLUCOSE: 116
HEMATOCRIT: 43.3
HEMOGLOBIN: 14.9
IRON BIND.CAP.(TIBC): 329
IRON SATURATION: 36
IRON: 120
MCH: 30.7
MCHC: 34.4
MCV: 89.1
MPV: 9.7
PLATELET COUNT: 274
POTASSIUM: 3.8
PROTEIN, TOTAL: 6.5
RDW: 12.4
RED BLOOD CELL COUNT: 4.86
SODIUM: 138
WHITE BLOOD CELL COUNT: 8.3

## 2024-07-29 ENCOUNTER — OFFICE VISIT (OUTPATIENT)
Dept: URBAN - METROPOLITAN AREA CLINIC 79 | Facility: CLINIC | Age: 39
End: 2024-07-29

## 2024-07-31 ENCOUNTER — WEB ENCOUNTER (OUTPATIENT)
Dept: URBAN - METROPOLITAN AREA CLINIC 19 | Facility: CLINIC | Age: 39
End: 2024-07-31

## 2024-08-08 ENCOUNTER — WEB ENCOUNTER (OUTPATIENT)
Dept: URBAN - METROPOLITAN AREA CLINIC 19 | Facility: CLINIC | Age: 39
End: 2024-08-08

## 2024-08-09 ENCOUNTER — TELEPHONE ENCOUNTER (OUTPATIENT)
Dept: URBAN - METROPOLITAN AREA CLINIC 6 | Facility: CLINIC | Age: 39
End: 2024-08-09

## 2024-08-09 RX ORDER — PREDNISONE 5 MG/1
8 TABLETS ONCE A DAY FOR 7 DAYS, 7 TABLETS ONCE A DAY FOR 7 DAYS, 6 TABLETS ONCE A DAY FOR 7 DAYS, 5 TABLETS ONCE A DAY FOR 7 DAYS, 4 TABLETS ONCE A DAY FOR 7 DAYS, 3 TABLETS ONCE A DAY FOR 7 DAYS, 2 TABLETS ONCE A DAY FOR 7 DAYS, 1 TABLET ONCE A DAY FOR 7 DAYS TABLET ORAL
Qty: 252 TABLET | Refills: 0 | OUTPATIENT
Start: 2024-08-09 | End: 2024-10-04

## 2024-08-09 RX ORDER — VEDOLIZUMAB 300 MG/5ML
AS DIRECTED INJECTION, POWDER, LYOPHILIZED, FOR SOLUTION INTRAVENOUS
Refills: 6

## 2024-08-19 ENCOUNTER — OFFICE VISIT (OUTPATIENT)
Dept: URBAN - METROPOLITAN AREA CLINIC 91 | Facility: CLINIC | Age: 39
End: 2024-08-19

## 2024-08-19 ENCOUNTER — OFFICE VISIT (OUTPATIENT)
Dept: URBAN - METROPOLITAN AREA CLINIC 97 | Facility: CLINIC | Age: 39
End: 2024-08-19
Payer: COMMERCIAL

## 2024-08-19 VITALS
TEMPERATURE: 97.9 F | DIASTOLIC BLOOD PRESSURE: 88 MMHG | HEIGHT: 62 IN | BODY MASS INDEX: 35.88 KG/M2 | HEART RATE: 79 BPM | RESPIRATION RATE: 20 BRPM | SYSTOLIC BLOOD PRESSURE: 137 MMHG | WEIGHT: 195 LBS

## 2024-08-19 DIAGNOSIS — K50.80 CROHN'S COLITIS: ICD-10-CM

## 2024-08-19 PROCEDURE — 96413 CHEMO IV INFUSION 1 HR: CPT | Performed by: INTERNAL MEDICINE

## 2024-08-19 RX ORDER — PREDNISONE 5 MG/1
8 TABLETS ONCE A DAY FOR 7 DAYS, 7 TABLETS ONCE A DAY FOR 7 DAYS, 6 TABLETS ONCE A DAY FOR 7 DAYS, 5 TABLETS ONCE A DAY FOR 7 DAYS, 4 TABLETS ONCE A DAY FOR 7 DAYS, 3 TABLETS ONCE A DAY FOR 7 DAYS, 2 TABLETS ONCE A DAY FOR 7 DAYS, 1 TABLET ONCE A DAY FOR 7 DAYS TABLET ORAL
Qty: 252 TABLET | Refills: 0 | Status: ACTIVE | COMMUNITY
Start: 2024-08-09 | End: 2024-10-04

## 2024-08-19 RX ORDER — IRON FUM,PS CMP/VIT C/NIACIN 125-40-3MG
1 CAPSULE BETWEEN MEALS CAPSULE ORAL ONCE A DAY
Qty: 90 CAPSULE | Refills: 3 | Status: ACTIVE | COMMUNITY
Start: 2023-12-12

## 2024-08-19 RX ORDER — VEDOLIZUMAB 300 MG/5ML
INFUSE EVERY 8 WEEKS INJECTION, POWDER, LYOPHILIZED, FOR SOLUTION INTRAVENOUS
Qty: 3 | Refills: 3 | Status: ACTIVE | COMMUNITY
Start: 2024-01-03 | End: 2024-08-30

## 2024-08-19 RX ORDER — VEDOLIZUMAB 300 MG/5ML
AS DIRECTED INJECTION, POWDER, LYOPHILIZED, FOR SOLUTION INTRAVENOUS
Refills: 6 | Status: ACTIVE | COMMUNITY

## 2024-08-22 ENCOUNTER — OFFICE VISIT (OUTPATIENT)
Dept: URBAN - METROPOLITAN AREA CLINIC 91 | Facility: CLINIC | Age: 39
End: 2024-08-22

## 2024-09-26 ENCOUNTER — DASHBOARD ENCOUNTERS (OUTPATIENT)
Age: 39
End: 2024-09-26

## 2024-09-26 ENCOUNTER — OFFICE VISIT (OUTPATIENT)
Dept: URBAN - METROPOLITAN AREA CLINIC 19 | Facility: CLINIC | Age: 39
End: 2024-09-26
Payer: COMMERCIAL

## 2024-09-26 VITALS
BODY MASS INDEX: 37.5 KG/M2 | WEIGHT: 203.8 LBS | SYSTOLIC BLOOD PRESSURE: 120 MMHG | HEIGHT: 62 IN | DIASTOLIC BLOOD PRESSURE: 80 MMHG | TEMPERATURE: 98.7 F | HEART RATE: 94 BPM

## 2024-09-26 DIAGNOSIS — K50.80 CROHN'S DISEASE OF BOTH SMALL AND LARGE INTESTINE: ICD-10-CM

## 2024-09-26 PROCEDURE — 99214 OFFICE O/P EST MOD 30 MIN: CPT | Performed by: NURSE PRACTITIONER

## 2024-09-26 RX ORDER — PREDNISONE 5 MG/1
8 TABLETS ONCE A DAY FOR 7 DAYS, 7 TABLETS ONCE A DAY FOR 7 DAYS, 6 TABLETS ONCE A DAY FOR 7 DAYS, 5 TABLETS ONCE A DAY FOR 7 DAYS, 4 TABLETS ONCE A DAY FOR 7 DAYS, 3 TABLETS ONCE A DAY FOR 7 DAYS, 2 TABLETS ONCE A DAY FOR 7 DAYS, 1 TABLET ONCE A DAY FOR 7 DAYS TABLET ORAL
Qty: 252 TABLET | Refills: 0 | Status: ACTIVE | COMMUNITY
Start: 2024-08-09 | End: 2024-10-04

## 2024-09-26 RX ORDER — IRON FUM,PS CMP/VIT C/NIACIN 125-40-3MG
1 CAPSULE BETWEEN MEALS CAPSULE ORAL ONCE A DAY
Qty: 90 CAPSULE | Refills: 3 | Status: ACTIVE | COMMUNITY
Start: 2023-12-12

## 2024-09-26 RX ORDER — PREDNISONE 5 MG/1
1 TABLET TABLET ORAL ONCE A DAY
Qty: 7 TABLET | Refills: 0 | OUTPATIENT
Start: 2024-09-26 | End: 2024-10-03

## 2024-09-26 RX ORDER — VEDOLIZUMAB 300 MG/5ML
AS DIRECTED INJECTION, POWDER, LYOPHILIZED, FOR SOLUTION INTRAVENOUS
Refills: 6 | Status: ACTIVE | COMMUNITY

## 2024-09-26 NOTE — PHYSICAL EXAM GASTROINTESTINAL
Abdomen , soft, nontender, nondistended , no guarding or rigidity ,Liver and Spleen , no hepatosplenomegaly

## 2024-09-26 NOTE — HPI-TODAY'S VISIT:
Ms. Vanessa is a 38-year-old female with Crohn's disease currently on Entyvio who presents today for 6mo follow-up.   Last month she called in reporting a flare with left-sided pain, mucus and blood in stool about 4 BMs/day.  She had normal labs but fecal calprotectin was high.  We did not have a baseline fecal calprotectin to compare to.  She was given prednisone slow taper Continues on Entyvio  She completes taper next week (dropped her bottle so lost some pills) Feeling very well. No more abdominal pain, diarrhea or blood in stool. She states the only thing that changed was she stopped taking a supplement  "serenol" (that she was taking for anxiety ordered by her GYN) and seemed her flare started after that. Reports she was recently diagnosed with lymphedema to her left leg. Unclear cause. Saw Vascular, possible pinched nerves. The swelling has been going on a few years. Sees Derm in November.        - - - - - - - - - - - - - - - - - - - - - - - -        Prior history is summarized below: -On 4/12/2011 she had a colonoscopy that showed "inflammation and friability of the distal sigmoid and rectum" Pathology showed "moderately active procitis" with no evidence of granulomas, viral inclusions, or dysplasia. After the procedure she was prescribed anusol and sulfasalazine.  -On 10/16/2018 she had a colonoscopy that showed discontinous areas of ulcerated mucosa in the rectum, sigmoid colon, ascending colon, and cecum; the transverse colon, descending colon and terminal ileum were normal. Pathology of the cecum, ascending colon, transverse colon, sigmoid colon, and rectum showed chronic active colitis; pathology of the descending colon was unremarkable. -Labs from 10/23/2018 showed quantiferon TB was negative, vitamin D 20.1, Hep A ab total negative, Hep B s Ab negative, Hep B c Ab total negative, Hep B s AB consistent with immunity, Hep C Ab negative- she reports completing Vitamin D Rx provided -On 10/30/2018 she had a MRE that was negative other than "very large amount of stool noted throughout the colon from the cecum through the distal sigmoid colon without stricture noted." -She started cimiza on 2/13/2019  -Switched to Entyvio 10/21/2021. Stated as soon as she started Entyvio noticed significant symptom improvement within a week. -Colonoscopy by Dr Min on 6/27/2022: Path from rectum with patchy mild chronic inactive colitis, consistent with quiescent inflammatory bowel disease negative for infection dysplasia or malignancy. Terminal ileum, cecum, ascending colon, transverse colon, descending colon, sigmoid colon biopsies were all negative. -She was due for her next dose on January 6, 2023 but unfortunately her insurance denied and stating that they wanted her to try and fail Stelara first even though Entyvio was working very well for her. She had been without treatment at her last follow-up and thankfully was without any signs of flareups. 2/2023 labs - CBC and CMP were overall unremarkable. Iron level 80, TIBC 363, 22% iron saturation, ferritin low 7.8, vitamin B12 and folate were normal, vitamin D 17, CRP normal. TB Quant negative. Was ordered to start on ferrous gluconate daily but she never took it. -Entyvio eventually got approved and she resumed infusions on 3/24/2023 which she receives at our [place] office. 12/12/2023. Labs at the time with normal Hgb 15.2, iron 27, , 32% iron sat, ferritin 14, CMP and CRP were unremarkable. Started daily Integra 2/23/2024 colonoscopy with Dr. Min - entire portion of the ileum was normal, entire colon was normal. Biopsies from TI and random colon biopsies were negative. 2-year follow-up given..

## 2024-09-27 LAB
A/G RATIO: 1.6
ALBUMIN: 4.1
ALKALINE PHOSPHATASE: 53
ALT (SGPT): 14
AST (SGOT): 12
BILIRUBIN, TOTAL: 0.4
BUN/CREATININE RATIO: (no result)
BUN: 11
C-REACTIVE PROTEIN, QUANT: 3
CALCIUM: 9.2
CARBON DIOXIDE, TOTAL: 27
CHLORIDE: 103
CREATININE: 0.89
EGFR: 85
GLOBULIN, TOTAL: 2.5
GLUCOSE: 111
HEMATOCRIT: 44.7
HEMOGLOBIN: 15.3
MCH: 31
MCHC: 34.2
MCV: 90.7
MPV: 9.2
PLATELET COUNT: 312
POTASSIUM: 4.1
PROTEIN, TOTAL: 6.6
RDW: 13.2
RED BLOOD CELL COUNT: 4.93
SODIUM: 140
WHITE BLOOD CELL COUNT: 12.8

## 2024-10-14 ENCOUNTER — OFFICE VISIT (OUTPATIENT)
Dept: URBAN - METROPOLITAN AREA CLINIC 91 | Facility: CLINIC | Age: 39
End: 2024-10-14

## 2024-10-17 ENCOUNTER — WEB ENCOUNTER (OUTPATIENT)
Dept: URBAN - METROPOLITAN AREA CLINIC 19 | Facility: CLINIC | Age: 39
End: 2024-10-17

## 2024-12-06 ENCOUNTER — OFFICE VISIT (OUTPATIENT)
Dept: URBAN - METROPOLITAN AREA CLINIC 79 | Facility: CLINIC | Age: 39
End: 2024-12-06
Payer: COMMERCIAL

## 2024-12-06 VITALS
HEART RATE: 84 BPM | SYSTOLIC BLOOD PRESSURE: 113 MMHG | TEMPERATURE: 97.2 F | BODY MASS INDEX: 34.96 KG/M2 | WEIGHT: 190 LBS | HEIGHT: 62 IN | RESPIRATION RATE: 19 BRPM | DIASTOLIC BLOOD PRESSURE: 72 MMHG

## 2024-12-06 DIAGNOSIS — K50.90 CROHN'S DISEASE: ICD-10-CM

## 2024-12-06 PROCEDURE — 96413 CHEMO IV INFUSION 1 HR: CPT | Performed by: STUDENT IN AN ORGANIZED HEALTH CARE EDUCATION/TRAINING PROGRAM

## 2024-12-06 RX ORDER — VEDOLIZUMAB 300 MG/5ML
AS DIRECTED INJECTION, POWDER, LYOPHILIZED, FOR SOLUTION INTRAVENOUS
Refills: 6 | Status: ACTIVE | COMMUNITY

## 2024-12-06 RX ORDER — IRON FUM,PS CMP/VIT C/NIACIN 125-40-3MG
1 CAPSULE BETWEEN MEALS CAPSULE ORAL ONCE A DAY
Qty: 90 CAPSULE | Refills: 3 | Status: ACTIVE | COMMUNITY
Start: 2023-12-12

## 2024-12-09 ENCOUNTER — OFFICE VISIT (OUTPATIENT)
Dept: URBAN - METROPOLITAN AREA CLINIC 91 | Facility: CLINIC | Age: 39
End: 2024-12-09

## 2025-01-30 ENCOUNTER — OFFICE VISIT (OUTPATIENT)
Dept: URBAN - METROPOLITAN AREA CLINIC 91 | Facility: CLINIC | Age: 40
End: 2025-01-30

## 2025-01-31 ENCOUNTER — OFFICE VISIT (OUTPATIENT)
Dept: URBAN - METROPOLITAN AREA CLINIC 79 | Facility: CLINIC | Age: 40
End: 2025-01-31

## 2025-01-31 VITALS
BODY MASS INDEX: 36.29 KG/M2 | SYSTOLIC BLOOD PRESSURE: 107 MMHG | WEIGHT: 197.2 LBS | HEIGHT: 62 IN | TEMPERATURE: 98.6 F | DIASTOLIC BLOOD PRESSURE: 68 MMHG

## 2025-01-31 RX ORDER — VEDOLIZUMAB 300 MG/5ML
AS DIRECTED INJECTION, POWDER, LYOPHILIZED, FOR SOLUTION INTRAVENOUS
Refills: 6 | Status: ACTIVE | COMMUNITY

## 2025-01-31 RX ORDER — IRON FUM,PS CMP/VIT C/NIACIN 125-40-3MG
1 CAPSULE BETWEEN MEALS CAPSULE ORAL ONCE A DAY
Qty: 90 CAPSULE | Refills: 3 | Status: ACTIVE | COMMUNITY
Start: 2023-12-12

## 2025-03-24 ENCOUNTER — OFFICE VISIT (OUTPATIENT)
Dept: URBAN - METROPOLITAN AREA CLINIC 19 | Facility: CLINIC | Age: 40
End: 2025-03-24
Payer: COMMERCIAL

## 2025-03-24 DIAGNOSIS — K50.80 CROHN'S COLITIS: ICD-10-CM

## 2025-03-24 PROCEDURE — 99215 OFFICE O/P EST HI 40 MIN: CPT | Performed by: NURSE PRACTITIONER

## 2025-03-24 RX ORDER — VEDOLIZUMAB 300 MG/5ML
AS DIRECTED INJECTION, POWDER, LYOPHILIZED, FOR SOLUTION INTRAVENOUS
Refills: 6 | Status: ACTIVE | COMMUNITY

## 2025-03-24 RX ORDER — IRON FUM,PS CMP/VIT C/NIACIN 125-40-3MG
1 CAPSULE BETWEEN MEALS CAPSULE ORAL ONCE A DAY
Qty: 90 CAPSULE | Refills: 3 | Status: ON HOLD | COMMUNITY
Start: 2023-12-12

## 2025-03-24 NOTE — HPI-TODAY'S VISIT:
Ms. Vanessa is a 39-year-old female with Crohn's disease currently on Entyvio who presents today for 6mo follow-up.  No abdominal pain, no diarrhea and no blood in stool. Vit D was low last year and she never started taking supplement.      - - - - - - - - - - - - - - - - - - - - - - - -        Prior history is summarized below: -On 4/12/2011 she had a colonoscopy that showed "inflammation and friability of the distal sigmoid and rectum" Pathology showed "moderately active procitis" with no evidence of granulomas, viral inclusions, or dysplasia. After the procedure she was prescribed anusol and sulfasalazine.  -On 10/16/2018 she had a colonoscopy that showed discontinous areas of ulcerated mucosa in the rectum, sigmoid colon, ascending colon, and cecum; the transverse colon, descending colon and terminal ileum were normal. Pathology of the cecum, ascending colon, transverse colon, sigmoid colon, and rectum showed chronic active colitis; pathology of the descending colon was unremarkable. -Labs from 10/23/2018 showed quantiferon TB was negative, vitamin D 20.1, Hep A ab total negative, Hep B s Ab negative, Hep B c Ab total negative, Hep B s AB consistent with immunity, Hep C Ab negative- she reports completing Vitamin D Rx provided -On 10/30/2018 she had a MRE that was negative other than "very large amount of stool noted throughout the colon from the cecum through the distal sigmoid colon without stricture noted." -She started cimiza on 2/13/2019  -Switched to Entyvio 10/21/2021. Stated as soon as she started Entyvio noticed significant symptom improvement within a week. -Colonoscopy by Dr Min on 6/27/2022: Path from rectum with patchy mild chronic inactive colitis, consistent with quiescent inflammatory bowel disease negative for infection dysplasia or malignancy. Terminal ileum, cecum, ascending colon, transverse colon, descending colon, sigmoid colon biopsies were all negative. -She was due for her next dose on January 6, 2023 but unfortunately her insurance denied and stating that they wanted her to try and fail Stelara first even though Entyvio was working very well for her. She had been without treatment at her last follow-up and thankfully was without any signs of flareups. 2/2023 labs - CBC and CMP were overall unremarkable. Iron level 80, TIBC 363, 22% iron saturation, ferritin low 7.8, vitamin B12 and folate were normal, vitamin D 17, CRP normal. TB Quant negative. Was ordered to start on ferrous gluconate daily but she never took it. -Entyvio eventually got approved and she resumed infusions on 3/24/2023 which she receives at our [place] office. 12/12/2023. Labs at the time with normal Hgb 15.2, iron 27, , 32% iron sat, ferritin 14, CMP and CRP were unremarkable. Started daily Integra 2/23/2024 colonoscopy with Dr. Min - entire portion of the ileum was normal, entire colon was normal. Biopsies from TI and random colon biopsies were negative. 2-year follow-up given.. 7/29/2024 fecal calprotectin 4660 8/2024 treated with Prednisone taper 9/30/2024 fecal calprotectin 271 9/2024 she reported being diagnosed with lymphedema to her left leg. Unclear cause. Saw Vascular, possible pinched nerves. Put on compression stockings. Then was referred to [facility] for procedures on her veins in May for venous insufficency. -Saw Derm in 11/2024 with normal full body scan.

## 2025-03-25 ENCOUNTER — OFFICE VISIT (OUTPATIENT)
Dept: URBAN - METROPOLITAN AREA CLINIC 79 | Facility: CLINIC | Age: 40
End: 2025-03-25
Payer: COMMERCIAL

## 2025-03-25 DIAGNOSIS — K50.90 CROHN'S DISEASE: ICD-10-CM

## 2025-03-25 PROCEDURE — 96413 CHEMO IV INFUSION 1 HR: CPT | Performed by: INTERNAL MEDICINE

## 2025-03-25 RX ORDER — IRON FUM,PS CMP/VIT C/NIACIN 125-40-3MG
1 CAPSULE BETWEEN MEALS CAPSULE ORAL ONCE A DAY
Qty: 90 CAPSULE | Refills: 3 | Status: ON HOLD | COMMUNITY
Start: 2023-12-12

## 2025-03-25 RX ORDER — VEDOLIZUMAB 300 MG/5ML
AS DIRECTED INJECTION, POWDER, LYOPHILIZED, FOR SOLUTION INTRAVENOUS
Refills: 6 | Status: ACTIVE | COMMUNITY

## 2025-03-27 LAB
A/G RATIO: 1.9
ALBUMIN: 4.7
ALKALINE PHOSPHATASE: 59
ALT (SGPT): 13
AST (SGOT): 17
BILIRUBIN, TOTAL: 0.5
BUN/CREATININE RATIO: (no result)
BUN: 9
C-REACTIVE PROTEIN, QUANT: 3.6
CALCIUM: 9.6
CARBON DIOXIDE, TOTAL: 28
CHLORIDE: 103
CREATININE: 0.82
EGFR: 93
GLOBULIN, TOTAL: 2.5
GLUCOSE: 75
HEMATOCRIT: 46.5
HEMOGLOBIN: 16
HEPATITIS A AB, TOTAL: REACTIVE
HEPATITIS B CORE AB TOTAL: (no result)
HEPATITIS B SURFACE AB IMMUNITY, QN: 39
HEPATITIS B SURFACE ANTIGEN: (no result)
HEPATITIS C ANTIBODY (REFL): (no result)
MCH: 30.2
MCHC: 34.4
MCV: 87.7
MITOGEN-NIL: >10
MPV: 9.8
PLATELET COUNT: 340
POTASSIUM: 4.2
PROTEIN, TOTAL: 7.2
QUANTIFERON NIL VALUE: 0.04
QUANTIFERON TB1 AG VALUE: 0
QUANTIFERON TB2 AG VALUE: 0
QUANTIFERON-TB GOLD PLUS: NEGATIVE
RDW: 12.8
RED BLOOD CELL COUNT: 5.3
SODIUM: 139
VITAMIN D,25-OH,TOTAL,IA: 31
WHITE BLOOD CELL COUNT: 7.5

## 2025-05-22 ENCOUNTER — OFFICE VISIT (OUTPATIENT)
Dept: URBAN - METROPOLITAN AREA CLINIC 91 | Facility: CLINIC | Age: 40
End: 2025-05-22

## 2025-06-02 ENCOUNTER — OFFICE VISIT (OUTPATIENT)
Dept: URBAN - METROPOLITAN AREA CLINIC 79 | Facility: CLINIC | Age: 40
End: 2025-06-02
Payer: COMMERCIAL

## 2025-06-02 DIAGNOSIS — K50.90 CROHN'S DISEASE: ICD-10-CM

## 2025-06-02 PROCEDURE — 96413 CHEMO IV INFUSION 1 HR: CPT | Performed by: INTERNAL MEDICINE

## 2025-06-02 RX ORDER — VEDOLIZUMAB 300 MG/5ML
AS DIRECTED INJECTION, POWDER, LYOPHILIZED, FOR SOLUTION INTRAVENOUS
Refills: 6 | Status: ACTIVE | COMMUNITY

## 2025-06-02 RX ORDER — IRON FUM,PS CMP/VIT C/NIACIN 125-40-3MG
1 CAPSULE BETWEEN MEALS CAPSULE ORAL ONCE A DAY
Qty: 90 CAPSULE | Refills: 3 | Status: ON HOLD | COMMUNITY
Start: 2023-12-12

## 2025-06-12 ENCOUNTER — OFFICE VISIT (OUTPATIENT)
Dept: URBAN - METROPOLITAN AREA CLINIC 91 | Facility: CLINIC | Age: 40
End: 2025-06-12

## 2025-07-31 ENCOUNTER — OFFICE VISIT (OUTPATIENT)
Dept: URBAN - METROPOLITAN AREA CLINIC 91 | Facility: CLINIC | Age: 40
End: 2025-07-31

## 2025-07-31 RX ORDER — VEDOLIZUMAB 300 MG/5ML
AS DIRECTED INJECTION, POWDER, LYOPHILIZED, FOR SOLUTION INTRAVENOUS
Refills: 6 | Status: ACTIVE | COMMUNITY

## 2025-07-31 RX ORDER — IRON FUM,PS CMP/VIT C/NIACIN 125-40-3MG
1 CAPSULE BETWEEN MEALS CAPSULE ORAL ONCE A DAY
Qty: 90 CAPSULE | Refills: 3 | Status: ON HOLD | COMMUNITY
Start: 2023-12-12